# Patient Record
Sex: FEMALE | Race: WHITE | NOT HISPANIC OR LATINO | Employment: FULL TIME | URBAN - METROPOLITAN AREA
[De-identification: names, ages, dates, MRNs, and addresses within clinical notes are randomized per-mention and may not be internally consistent; named-entity substitution may affect disease eponyms.]

---

## 2018-02-21 ENCOUNTER — TRANSFERRED RECORDS (OUTPATIENT)
Dept: HEALTH INFORMATION MANAGEMENT | Facility: CLINIC | Age: 33
End: 2018-02-21

## 2018-02-21 LAB
ABO + RH BLD: NORMAL
ABO + RH BLD: NORMAL
BLD GP AB SCN SERPL QL: NORMAL
HBV SURFACE AG SERPL QL IA: NORMAL
HEP C HIM: NORMAL
HIV 1+2 AB+HIV1 P24 AG SERPL QL IA: NORMAL
RUBELLA ABY IGG: 48
RUBELLA ANTIBODY IGG QUANTITATIVE: NORMAL IU/ML
T PALLIDUM IGG SER QL: NORMAL

## 2018-09-18 LAB — GROUP B STREP PCR: NORMAL

## 2018-10-02 ENCOUNTER — HOSPITAL ENCOUNTER (OUTPATIENT)
Facility: CLINIC | Age: 33
Discharge: HOME | End: 2018-10-02
Attending: OBSTETRICS & GYNECOLOGY | Admitting: OBSTETRICS & GYNECOLOGY
Payer: OTHER GOVERNMENT

## 2018-10-02 VITALS
WEIGHT: 169 LBS | BODY MASS INDEX: 29.95 KG/M2 | HEIGHT: 63 IN | RESPIRATION RATE: 18 BRPM | TEMPERATURE: 99.1 F | SYSTOLIC BLOOD PRESSURE: 132 MMHG | DIASTOLIC BLOOD PRESSURE: 80 MMHG

## 2018-10-02 LAB
ALBUMIN SERPL-MCNC: 2.8 G/DL (ref 3.4–5)
ALP SERPL-CCNC: 116 U/L (ref 40–150)
ALT SERPL W P-5'-P-CCNC: 16 U/L (ref 0–50)
ANION GAP SERPL CALCULATED.3IONS-SCNC: 13 MMOL/L (ref 3–14)
AST SERPL W P-5'-P-CCNC: 16 U/L (ref 0–45)
BILIRUB SERPL-MCNC: 0.3 MG/DL (ref 0.2–1.3)
BUN SERPL-MCNC: 7 MG/DL (ref 7–30)
CALCIUM SERPL-MCNC: 8.6 MG/DL (ref 8.5–10.1)
CHLORIDE SERPL-SCNC: 103 MMOL/L (ref 94–109)
CO2 SERPL-SCNC: 22 MMOL/L (ref 20–32)
CREAT SERPL-MCNC: 0.52 MG/DL (ref 0.52–1.04)
ERYTHROCYTE [DISTWIDTH] IN BLOOD BY AUTOMATED COUNT: 12.5 % (ref 10–15)
GFR SERPL CREATININE-BSD FRML MDRD: >90 ML/MIN/1.7M2
GLUCOSE SERPL-MCNC: 81 MG/DL (ref 70–99)
HCT VFR BLD AUTO: 38.1 % (ref 35–47)
HGB BLD-MCNC: 13 G/DL (ref 11.7–15.7)
MCH RBC QN AUTO: 31.5 PG (ref 26.5–33)
MCHC RBC AUTO-ENTMCNC: 34.1 G/DL (ref 31.5–36.5)
MCV RBC AUTO: 92 FL (ref 78–100)
PLATELET # BLD AUTO: 126 10E9/L (ref 150–450)
POTASSIUM SERPL-SCNC: 3.9 MMOL/L (ref 3.4–5.3)
PROT SERPL-MCNC: 6.8 G/DL (ref 6.8–8.8)
PROT UR-MCNC: 0.06 G/L
PROT/CREAT 24H UR: 0.26 G/G CR (ref 0–0.2)
RBC # BLD AUTO: 4.13 10E12/L (ref 3.8–5.2)
SODIUM SERPL-SCNC: 138 MMOL/L (ref 133–144)
WBC # BLD AUTO: 8.4 10E9/L (ref 4–11)

## 2018-10-02 PROCEDURE — 85027 COMPLETE CBC AUTOMATED: CPT | Performed by: OBSTETRICS & GYNECOLOGY

## 2018-10-02 PROCEDURE — 59025 FETAL NON-STRESS TEST: CPT

## 2018-10-02 PROCEDURE — G0463 HOSPITAL OUTPT CLINIC VISIT: HCPCS | Mod: 25

## 2018-10-02 PROCEDURE — 80053 COMPREHEN METABOLIC PANEL: CPT | Performed by: OBSTETRICS & GYNECOLOGY

## 2018-10-02 PROCEDURE — 84156 ASSAY OF PROTEIN URINE: CPT | Performed by: OBSTETRICS & GYNECOLOGY

## 2018-10-02 PROCEDURE — 36415 COLL VENOUS BLD VENIPUNCTURE: CPT | Performed by: OBSTETRICS & GYNECOLOGY

## 2018-10-02 RX ORDER — ONDANSETRON 2 MG/ML
4 INJECTION INTRAMUSCULAR; INTRAVENOUS EVERY 6 HOURS PRN
Status: DISCONTINUED | OUTPATIENT
Start: 2018-10-02 | End: 2018-10-02 | Stop reason: HOSPADM

## 2018-10-02 NOTE — IP AVS SNAPSHOT
MRN:[id number]                      After Visit Summary   10/2/2018    Judith Fuentes    MRN: [id number]           Thank you!     Thank you for choosing [facility] for your care. Our goal is always to provide you with excellent care. Hearing back from our patients is one way we can continue to improve our services. Please take a few minutes to complete the written survey that you may receive in the mail after you visit with us. Thank you!        Patient Information     Date Of Birth          1985        About your hospital stay     You were admitted on:  October 2, 2018 You last received care in the:  [facility]    You were discharged on:  October 2, 2018       Who to Call     For medical emergencies, please call 911.  For non-urgent questions about your medical care, please call your primary care provider or clinic, [phone]          Attending Provider     Provider Specialty    Yamilka Jaramillo MD OB/Gyn       Primary Care Provider Office Phone # Fax #    Yamilka Jaramillo [phone]      Further instructions from your care team       Discharge Instruction for Undelivered Patients      You were seen for: blood pressure evaluation  We Consulted: Dr. Jaramillo  You had (Test or Medicine): Fetal non-stress test, labs, and blood pressure checks     Diet:   Drink 8 to 12 glasses of liquids (milk, juice, water) every day.  You may eat meals and snacks.     Activity:  Call your doctor or nurse midwife if your baby is moving less than usual.     Call your provider if you notice:  Swelling in your face or increased swelling in your hands or legs.  Headaches that are not relieved by Tylenol (acetaminophen).  Changes in your vision (blurring: seeing spots or stars.)  Nausea (sick to your stomach) and vomiting (throwing up).   Weight gain of 5 pounds or more per week.  Heartburn that doesn't go away.  Signs of bladder infection: pain when you urinate (use the toilet), need  "to go more often and more urgently.  The bag of lobo (rupture of membranes) breaks, or you notice leaking in your underwear.  Bright red blood in your underwear.  Abdominal (lower belly) or stomach pain.  Second (plus) baby: Contractions (tightening) less than 10 minutes apart and getting stronger.  Increase or change in vaginal discharge (note the color and amount)  Other:     Follow-up:  Make an appointment to be seen on Thursday          Pending Results     No orders found from 2018 to 10/3/2018.            Admission Information     Date & Time Provider Department Dept. Phone    10/2/2018 Yamilka Jaarmillo MD [facility] [phone]      Your Vitals Were     Blood Pressure Temperature Respirations Height Weight BMI (Body Mass Index)    132/78 99.1  F (37.3  C) (Temporal) 18 1.6 m (5' 3\") 76.7 kg (169 lb) 29.94 kg/m2      MyChart Information     [employer] lets you send messages to your doctor, view your test results, renew your prescriptions, schedule appointments and more. To sign up, go to [email or web address] . Click on \"Log in\" on the left side of the screen, which will take you to the Welcome page. Then click on \"Sign up Now\" on the right side of the page.     You will be asked to enter the access code listed below, as well as some personal information. Please follow the directions to create your username and password.     Your access code is: [email or web address]  Expires: 2018  5:45 PM     Your access code will  in 90 days. If you need help or a new code, please call your [facility] clinic or [phone].        Care EveryWhere ID     This is your Care EveryWhere ID. This could be used by other organizations to access your [facility] medical records  [id number]        Equal Access to Services     MALI LOVE : Jose L Haas, gibran richardson, maris lim, merari jean. So [facility] [phone].    ATENCIÓN: Si latesha burciaga " a albright disposición servicios gratuitos de asistencia lingüística. Mary stover [phone].    We comply with applicable federal civil rights laws and [place] laws. We do not discriminate on the basis of race, color, national origin, age, disability, sex, sexual orientation, or gender identity.               Review of your medicines      UNREVIEWED medicines. Ask your doctor about these medicines        Dose / Directions    prenatal multivitamin plus iron 27-0.8 MG Tabs per tablet        Dose:  1 tablet   Take 1 tablet by mouth daily   Refills:  0                Protect others around you: Learn how to safely use, store and throw away your medicines at www.disposemymeds.org.             Medication List: This is a list of all your medications and when to take them. Check marks below indicate your daily home schedule. Keep this list as a reference.      Medications           Morning Afternoon Evening Bedtime As Needed    prenatal multivitamin plus iron 27-0.8 MG Tabs per tablet   Take 1 tablet by mouth daily

## 2018-10-02 NOTE — IP AVS SNAPSHOT
[address], [address]    [facility] [address]    Phone:  [phone]                                       After Visit Summary   10/2/2018    Judith Fuentes    MRN: [id number]           After Visit Summary Signature Page     I have received my discharge instructions, and my questions have been answered. I have discussed any challenges I see with this plan with the nurse or doctor.    ..........................................................................................................................................  Patient/Patient Representative Signature      ..........................................................................................................................................  Patient Representative Print Name and Relationship to Patient    ..................................................               ................................................  Date                                   Time    ..........................................................................................................................................  Reviewed by Signature/Title    ...................................................              ..............................................  Date                                               Time          22EPIC Rev 08/18

## 2018-10-02 NOTE — DISCHARGE INSTRUCTIONS
Discharge Instruction for Undelivered Patients      You were seen for: blood pressure evaluation  We Consulted: Dr. Jaramillo  You had (Test or Medicine): Fetal non-stress test, labs, and blood pressure checks     Diet:   Drink 8 to 12 glasses of liquids (milk, juice, water) every day.  You may eat meals and snacks.     Activity:  Call your doctor or nurse midwife if your baby is moving less than usual.     Call your provider if you notice:  Swelling in your face or increased swelling in your hands or legs.  Headaches that are not relieved by Tylenol (acetaminophen).  Changes in your vision (blurring: seeing spots or stars.)  Nausea (sick to your stomach) and vomiting (throwing up).   Weight gain of 5 pounds or more per week.  Heartburn that doesn't go away.  Signs of bladder infection: pain when you urinate (use the toilet), need to go more often and more urgently.  The bag of lobo (rupture of membranes) breaks, or you notice leaking in your underwear.  Bright red blood in your underwear.  Abdominal (lower belly) or stomach pain.  Second (plus) baby: Contractions (tightening) less than 10 minutes apart and getting stronger.  Increase or change in vaginal discharge (note the color and amount)  Other:     Follow-up:  Make an appointment to be seen on Thursday

## 2018-10-02 NOTE — PLAN OF CARE
Data: [facility] at 1620.   Reason for maternal/fetal assessment per patient is Rule Out Pre-eclampsia   Patient is a . Prenatal record reviewed. Gestational Age 38w4d. Fetal movement present. Patient denies cramping, backache, vaginal discharge, bloody show, vaginal bleeding, edema, headache, visual disturbances, epigastric or URQ pain, abdominal pain, rupture of membranes.   Action: Verbal consent for EFM. Fetal assessment: Presumed adequate fetal oxygenation documented (see flow record). Patient instructed to report change in fetal movement, vaginal leaking of fluid or bleeding, abdominal pain, or any concerns related to the pregnancy to her nurse/physician.   Response: Dr. Jaramillo informed of lab values, blood pressure values, reactive NST, toco ctx q2-3 min, pt feels occasional and does not report as painful. Plan per provider is discharge to home, follow up in clinic on Thursday. Patient verbalized understanding of education and verbalized agreement with plan. Discharged ambulatory at 1750.

## 2018-10-05 ENCOUNTER — HOSPITAL ENCOUNTER (INPATIENT)
Facility: CLINIC | Age: 33
LOS: 1 days | Discharge: HOME | End: 2018-10-06
Attending: OBSTETRICS & GYNECOLOGY | Admitting: OBSTETRICS & GYNECOLOGY
Payer: OTHER GOVERNMENT

## 2018-10-05 ENCOUNTER — ANESTHESIA EVENT (OUTPATIENT)
Dept: OBGYN | Facility: CLINIC | Age: 33
End: 2018-10-05
Payer: OTHER GOVERNMENT

## 2018-10-05 ENCOUNTER — ANESTHESIA (OUTPATIENT)
Dept: OBGYN | Facility: CLINIC | Age: 33
End: 2018-10-05
Payer: OTHER GOVERNMENT

## 2018-10-05 PROBLEM — O13.9 GESTATIONAL HYPERTENSION: Status: ACTIVE | Noted: 2018-10-05

## 2018-10-05 LAB
ABO + RH BLD: NORMAL
ABO + RH BLD: NORMAL
ALBUMIN SERPL-MCNC: 2.7 G/DL (ref 3.4–5)
ALP SERPL-CCNC: 112 U/L (ref 40–150)
ALT SERPL W P-5'-P-CCNC: 15 U/L (ref 0–50)
ANION GAP SERPL CALCULATED.3IONS-SCNC: 8 MMOL/L (ref 3–14)
AST SERPL W P-5'-P-CCNC: 18 U/L (ref 0–45)
BASOPHILS # BLD AUTO: 0 10E9/L (ref 0–0.2)
BASOPHILS NFR BLD AUTO: 0.1 %
BILIRUB SERPL-MCNC: 0.3 MG/DL (ref 0.2–1.3)
BLD GP AB SCN SERPL QL: NORMAL
BLOOD BANK CMNT PATIENT-IMP: NORMAL
BUN SERPL-MCNC: 7 MG/DL (ref 7–30)
CALCIUM SERPL-MCNC: 8.2 MG/DL (ref 8.5–10.1)
CHLORIDE SERPL-SCNC: 107 MMOL/L (ref 94–109)
CO2 SERPL-SCNC: 24 MMOL/L (ref 20–32)
CREAT SERPL-MCNC: 0.52 MG/DL (ref 0.52–1.04)
DIFFERENTIAL METHOD BLD: ABNORMAL
EOSINOPHIL # BLD AUTO: 0.1 10E9/L (ref 0–0.7)
EOSINOPHIL NFR BLD AUTO: 1.4 %
ERYTHROCYTE [DISTWIDTH] IN BLOOD BY AUTOMATED COUNT: 12.5 % (ref 10–15)
GFR SERPL CREATININE-BSD FRML MDRD: >90 ML/MIN/1.7M2
GLUCOSE SERPL-MCNC: 76 MG/DL (ref 70–99)
HCT VFR BLD AUTO: 36.2 % (ref 35–47)
HGB BLD-MCNC: 12.2 G/DL (ref 11.7–15.7)
IMM GRANULOCYTES # BLD: 0 10E9/L (ref 0–0.4)
IMM GRANULOCYTES NFR BLD: 0.6 %
LYMPHOCYTES # BLD AUTO: 1.1 10E9/L (ref 0.8–5.3)
LYMPHOCYTES NFR BLD AUTO: 16.4 %
MCH RBC QN AUTO: 31.3 PG (ref 26.5–33)
MCHC RBC AUTO-ENTMCNC: 33.7 G/DL (ref 31.5–36.5)
MCV RBC AUTO: 93 FL (ref 78–100)
MONOCYTES # BLD AUTO: 0.5 10E9/L (ref 0–1.3)
MONOCYTES NFR BLD AUTO: 6.9 %
NEUTROPHILS # BLD AUTO: 5.2 10E9/L (ref 1.6–8.3)
NEUTROPHILS NFR BLD AUTO: 74.6 %
NRBC # BLD AUTO: 0 10*3/UL
NRBC BLD AUTO-RTO: 0 /100
PLATELET # BLD AUTO: 107 10E9/L (ref 150–450)
PLATELET # BLD AUTO: 117 10E9/L (ref 150–450)
POTASSIUM SERPL-SCNC: 4.1 MMOL/L (ref 3.4–5.3)
PROT SERPL-MCNC: 6.7 G/DL (ref 6.8–8.8)
RBC # BLD AUTO: 3.9 10E12/L (ref 3.8–5.2)
SODIUM SERPL-SCNC: 139 MMOL/L (ref 133–144)
SPECIMEN EXP DATE BLD: NORMAL
T PALLIDUM AB SER QL: NONREACTIVE
WBC # BLD AUTO: 6.9 10E9/L (ref 4–11)

## 2018-10-05 PROCEDURE — 12000037 ZZH R&B POSTPARTUM INTERMEDIATE

## 2018-10-05 PROCEDURE — 80053 COMPREHEN METABOLIC PANEL: CPT | Performed by: OBSTETRICS & GYNECOLOGY

## 2018-10-05 PROCEDURE — 25000128 H RX IP 250 OP 636: Performed by: ANESTHESIOLOGY

## 2018-10-05 PROCEDURE — 85049 AUTOMATED PLATELET COUNT: CPT | Performed by: ANESTHESIOLOGY

## 2018-10-05 PROCEDURE — 3E0R3BZ INTRODUCTION OF ANESTHETIC AGENT INTO SPINAL CANAL, PERCUTANEOUS APPROACH: ICD-10-PCS | Performed by: ANESTHESIOLOGY

## 2018-10-05 PROCEDURE — 86850 RBC ANTIBODY SCREEN: CPT | Performed by: OBSTETRICS & GYNECOLOGY

## 2018-10-05 PROCEDURE — 85025 COMPLETE CBC W/AUTO DIFF WBC: CPT | Performed by: OBSTETRICS & GYNECOLOGY

## 2018-10-05 PROCEDURE — 72200001 ZZH LABOR CARE VAGINAL DELIVERY SINGLE

## 2018-10-05 PROCEDURE — 27110038 ZZH RX 271: Performed by: ANESTHESIOLOGY

## 2018-10-05 PROCEDURE — 86780 TREPONEMA PALLIDUM: CPT | Performed by: OBSTETRICS & GYNECOLOGY

## 2018-10-05 PROCEDURE — 25000128 H RX IP 250 OP 636: Performed by: OBSTETRICS & GYNECOLOGY

## 2018-10-05 PROCEDURE — 10907ZC DRAINAGE OF AMNIOTIC FLUID, THERAPEUTIC FROM PRODUCTS OF CONCEPTION, VIA NATURAL OR ARTIFICIAL OPENING: ICD-10-PCS | Performed by: OBSTETRICS & GYNECOLOGY

## 2018-10-05 PROCEDURE — 25000125 ZZHC RX 250: Performed by: OBSTETRICS & GYNECOLOGY

## 2018-10-05 PROCEDURE — 00HU33Z INSERTION OF INFUSION DEVICE INTO SPINAL CANAL, PERCUTANEOUS APPROACH: ICD-10-PCS | Performed by: ANESTHESIOLOGY

## 2018-10-05 PROCEDURE — 0KQM0ZZ REPAIR PERINEUM MUSCLE, OPEN APPROACH: ICD-10-PCS | Performed by: OBSTETRICS & GYNECOLOGY

## 2018-10-05 PROCEDURE — 25000132 ZZH RX MED GY IP 250 OP 250 PS 637: Performed by: OBSTETRICS & GYNECOLOGY

## 2018-10-05 PROCEDURE — 36415 COLL VENOUS BLD VENIPUNCTURE: CPT | Performed by: OBSTETRICS & GYNECOLOGY

## 2018-10-05 PROCEDURE — 86900 BLOOD TYPING SEROLOGIC ABO: CPT | Performed by: OBSTETRICS & GYNECOLOGY

## 2018-10-05 PROCEDURE — 86901 BLOOD TYPING SEROLOGIC RH(D): CPT | Performed by: OBSTETRICS & GYNECOLOGY

## 2018-10-05 PROCEDURE — 36415 COLL VENOUS BLD VENIPUNCTURE: CPT | Performed by: ANESTHESIOLOGY

## 2018-10-05 PROCEDURE — 3E033VJ INTRODUCTION OF OTHER HORMONE INTO PERIPHERAL VEIN, PERCUTANEOUS APPROACH: ICD-10-PCS | Performed by: OBSTETRICS & GYNECOLOGY

## 2018-10-05 PROCEDURE — 37000011 ZZH ANESTHESIA WARD SERVICE

## 2018-10-05 RX ORDER — OXYTOCIN 10 [USP'U]/ML
10 INJECTION, SOLUTION INTRAMUSCULAR; INTRAVENOUS
Status: DISCONTINUED | OUTPATIENT
Start: 2018-10-05 | End: 2018-10-06 | Stop reason: HOSPADM

## 2018-10-05 RX ORDER — CARBOPROST TROMETHAMINE 250 UG/ML
250 INJECTION, SOLUTION INTRAMUSCULAR
Status: DISCONTINUED | OUTPATIENT
Start: 2018-10-05 | End: 2018-10-05

## 2018-10-05 RX ORDER — OXYTOCIN/0.9 % SODIUM CHLORIDE 30/500 ML
1-24 PLASTIC BAG, INJECTION (ML) INTRAVENOUS CONTINUOUS
Status: DISCONTINUED | OUTPATIENT
Start: 2018-10-05 | End: 2018-10-05

## 2018-10-05 RX ORDER — NALOXONE HYDROCHLORIDE 0.4 MG/ML
.1-.4 INJECTION, SOLUTION INTRAMUSCULAR; INTRAVENOUS; SUBCUTANEOUS
Status: DISCONTINUED | OUTPATIENT
Start: 2018-10-05 | End: 2018-10-05

## 2018-10-05 RX ORDER — OXYCODONE AND ACETAMINOPHEN 5; 325 MG/1; MG/1
1 TABLET ORAL
Status: DISCONTINUED | OUTPATIENT
Start: 2018-10-05 | End: 2018-10-05

## 2018-10-05 RX ORDER — AMOXICILLIN 250 MG
1 CAPSULE ORAL 2 TIMES DAILY
Status: DISCONTINUED | OUTPATIENT
Start: 2018-10-05 | End: 2018-10-06 | Stop reason: HOSPADM

## 2018-10-05 RX ORDER — ROPIVACAINE HYDROCHLORIDE 2 MG/ML
10 INJECTION, SOLUTION EPIDURAL; INFILTRATION; PERINEURAL ONCE
Status: COMPLETED | OUTPATIENT
Start: 2018-10-05 | End: 2018-10-05

## 2018-10-05 RX ORDER — OXYTOCIN/0.9 % SODIUM CHLORIDE 30/500 ML
100 PLASTIC BAG, INJECTION (ML) INTRAVENOUS CONTINUOUS
Status: DISCONTINUED | OUTPATIENT
Start: 2018-10-05 | End: 2018-10-06 | Stop reason: HOSPADM

## 2018-10-05 RX ORDER — OXYTOCIN/0.9 % SODIUM CHLORIDE 30/500 ML
340 PLASTIC BAG, INJECTION (ML) INTRAVENOUS CONTINUOUS PRN
Status: DISCONTINUED | OUTPATIENT
Start: 2018-10-05 | End: 2018-10-06 | Stop reason: HOSPADM

## 2018-10-05 RX ORDER — FENTANYL CITRATE 50 UG/ML
50-100 INJECTION, SOLUTION INTRAMUSCULAR; INTRAVENOUS
Status: DISCONTINUED | OUTPATIENT
Start: 2018-10-05 | End: 2018-10-05

## 2018-10-05 RX ORDER — NALOXONE HYDROCHLORIDE 0.4 MG/ML
.1-.4 INJECTION, SOLUTION INTRAMUSCULAR; INTRAVENOUS; SUBCUTANEOUS
Status: DISCONTINUED | OUTPATIENT
Start: 2018-10-05 | End: 2018-10-06 | Stop reason: HOSPADM

## 2018-10-05 RX ORDER — SODIUM CHLORIDE, SODIUM LACTATE, POTASSIUM CHLORIDE, CALCIUM CHLORIDE 600; 310; 30; 20 MG/100ML; MG/100ML; MG/100ML; MG/100ML
INJECTION, SOLUTION INTRAVENOUS CONTINUOUS
Status: DISCONTINUED | OUTPATIENT
Start: 2018-10-05 | End: 2018-10-05

## 2018-10-05 RX ORDER — ACETAMINOPHEN 325 MG/1
650 TABLET ORAL EVERY 4 HOURS PRN
Status: DISCONTINUED | OUTPATIENT
Start: 2018-10-05 | End: 2018-10-06 | Stop reason: HOSPADM

## 2018-10-05 RX ORDER — HYDROCORTISONE 2.5 %
CREAM (GRAM) TOPICAL 3 TIMES DAILY PRN
Status: DISCONTINUED | OUTPATIENT
Start: 2018-10-05 | End: 2018-10-06 | Stop reason: HOSPADM

## 2018-10-05 RX ORDER — OXYCODONE HYDROCHLORIDE 5 MG/1
5 TABLET ORAL EVERY 4 HOURS PRN
Status: DISCONTINUED | OUTPATIENT
Start: 2018-10-05 | End: 2018-10-06 | Stop reason: HOSPADM

## 2018-10-05 RX ORDER — IBUPROFEN 400 MG/1
800 TABLET, FILM COATED ORAL EVERY 6 HOURS PRN
Status: DISCONTINUED | OUTPATIENT
Start: 2018-10-05 | End: 2018-10-06 | Stop reason: HOSPADM

## 2018-10-05 RX ORDER — AMOXICILLIN 250 MG
2 CAPSULE ORAL 2 TIMES DAILY
Status: DISCONTINUED | OUTPATIENT
Start: 2018-10-05 | End: 2018-10-06 | Stop reason: HOSPADM

## 2018-10-05 RX ORDER — OXYTOCIN 10 [USP'U]/ML
10 INJECTION, SOLUTION INTRAMUSCULAR; INTRAVENOUS
Status: DISCONTINUED | OUTPATIENT
Start: 2018-10-05 | End: 2018-10-05

## 2018-10-05 RX ORDER — ONDANSETRON 2 MG/ML
4 INJECTION INTRAMUSCULAR; INTRAVENOUS EVERY 6 HOURS PRN
Status: DISCONTINUED | OUTPATIENT
Start: 2018-10-05 | End: 2018-10-05

## 2018-10-05 RX ORDER — EPHEDRINE SULFATE 50 MG/ML
5 INJECTION, SOLUTION INTRAMUSCULAR; INTRAVENOUS; SUBCUTANEOUS
Status: DISCONTINUED | OUTPATIENT
Start: 2018-10-05 | End: 2018-10-05

## 2018-10-05 RX ORDER — METHYLERGONOVINE MALEATE 0.2 MG/ML
200 INJECTION INTRAVENOUS
Status: DISCONTINUED | OUTPATIENT
Start: 2018-10-05 | End: 2018-10-05

## 2018-10-05 RX ORDER — NALBUPHINE HYDROCHLORIDE 10 MG/ML
2.5-5 INJECTION, SOLUTION INTRAMUSCULAR; INTRAVENOUS; SUBCUTANEOUS EVERY 6 HOURS PRN
Status: DISCONTINUED | OUTPATIENT
Start: 2018-10-05 | End: 2018-10-05

## 2018-10-05 RX ORDER — PRENATAL VIT/IRON FUM/FOLIC AC 27MG-0.8MG
1 TABLET ORAL DAILY
Status: DISCONTINUED | OUTPATIENT
Start: 2018-10-05 | End: 2018-10-06 | Stop reason: HOSPADM

## 2018-10-05 RX ORDER — BISACODYL 10 MG
10 SUPPOSITORY, RECTAL RECTAL DAILY PRN
Status: DISCONTINUED | OUTPATIENT
Start: 2018-10-07 | End: 2018-10-06 | Stop reason: HOSPADM

## 2018-10-05 RX ORDER — ACETAMINOPHEN 325 MG/1
650 TABLET ORAL EVERY 4 HOURS PRN
Status: DISCONTINUED | OUTPATIENT
Start: 2018-10-05 | End: 2018-10-05

## 2018-10-05 RX ORDER — HYDROMORPHONE HYDROCHLORIDE 1 MG/ML
0.3 INJECTION, SOLUTION INTRAMUSCULAR; INTRAVENOUS; SUBCUTANEOUS
Status: DISCONTINUED | OUTPATIENT
Start: 2018-10-05 | End: 2018-10-06 | Stop reason: HOSPADM

## 2018-10-05 RX ORDER — LANOLIN 100 %
OINTMENT (GRAM) TOPICAL
Status: DISCONTINUED | OUTPATIENT
Start: 2018-10-05 | End: 2018-10-06 | Stop reason: HOSPADM

## 2018-10-05 RX ORDER — LIDOCAINE 40 MG/G
CREAM TOPICAL
Status: DISCONTINUED | OUTPATIENT
Start: 2018-10-05 | End: 2018-10-05

## 2018-10-05 RX ORDER — IBUPROFEN 400 MG/1
800 TABLET, FILM COATED ORAL
Status: DISCONTINUED | OUTPATIENT
Start: 2018-10-05 | End: 2018-10-05

## 2018-10-05 RX ORDER — OXYTOCIN/0.9 % SODIUM CHLORIDE 30/500 ML
100-340 PLASTIC BAG, INJECTION (ML) INTRAVENOUS CONTINUOUS PRN
Status: COMPLETED | OUTPATIENT
Start: 2018-10-05 | End: 2018-10-05

## 2018-10-05 RX ADMIN — ROPIVACAINE HYDROCHLORIDE 10 ML: 2 INJECTION, SOLUTION EPIDURAL; INFILTRATION at 12:11

## 2018-10-05 RX ADMIN — OXYTOCIN-SODIUM CHLORIDE 0.9% IV SOLN 30 UNIT/500ML 2 MILLI-UNITS/MIN: 30-0.9/5 SOLUTION at 09:13

## 2018-10-05 RX ADMIN — ACETAMINOPHEN 650 MG: 325 TABLET, FILM COATED ORAL at 15:44

## 2018-10-05 RX ADMIN — SODIUM CHLORIDE, POTASSIUM CHLORIDE, SODIUM LACTATE AND CALCIUM CHLORIDE: 600; 310; 30; 20 INJECTION, SOLUTION INTRAVENOUS at 09:10

## 2018-10-05 RX ADMIN — Medication 12 ML/HR: at 12:07

## 2018-10-05 RX ADMIN — OXYTOCIN-SODIUM CHLORIDE 0.9% IV SOLN 30 UNIT/500ML 130 ML/HR: 30-0.9/5 SOLUTION at 14:20

## 2018-10-05 RX ADMIN — IBUPROFEN 800 MG: 400 TABLET ORAL at 21:59

## 2018-10-05 RX ADMIN — IBUPROFEN 800 MG: 400 TABLET ORAL at 15:43

## 2018-10-05 RX ADMIN — ACETAMINOPHEN 650 MG: 325 TABLET, FILM COATED ORAL at 20:04

## 2018-10-05 RX ADMIN — SENNOSIDES AND DOCUSATE SODIUM 1 TABLET: 8.6; 5 TABLET ORAL at 20:04

## 2018-10-05 NOTE — IP AVS SNAPSHOT
[address]    [place] [address]    Phone:  [phone]                                       After Visit Summary   10/5/2018    Judith Fuentes    MRN: [id number]           After Visit Summary Signature Page     I have received my discharge instructions, and my questions have been answered. I have discussed any challenges I see with this plan with the nurse or doctor.    ..........................................................................................................................................  Patient/Patient Representative Signature      ..........................................................................................................................................  Patient Representative Print Name and Relationship to Patient    ..................................................               ................................................  Date                                   Time    ..........................................................................................................................................  Reviewed by Signature/Title    ...................................................              ..............................................  Date                                               Time          22EPIC Rev 08/18

## 2018-10-05 NOTE — L&D DELIVERY NOTE
Judith Fuentes   Admission date: 10/5/2018  Delivery date:  10/05/18  Place of delivery: [facility]    The patient is a 33 year old  at 39w0d gestation admitted to labor and delivery for the indication of gestational hypertension.  Her  course was otherwise uncomplicated.  The estimate fetal weight is 7lbs.      Pitocin was started for induction right after the membranes were ruptured. The fluid was clear. For pain management she had epidural with good relief.      She progressed to complete dilation at 1330.  She had excellent maternal expulsive efforts. At 1415 she delivered a viable male infant with apgars of 9 at 1 min and 9 at 5 minutes.  The infant was bulb suctioned on the perineum and again upon delivery.  The infant was handed off to his parents and the nursery team in attendance.    The placenta delivered spontaneously and intact.  She received pitocin upon placental delivery.  The QBL was being calculated but estimated to be average.    The cervix and vagina were inspected.  There was a second degree perineal laceration which was repaired with 3-0 vicryl assuring hemostasis and close approximation.     During labor the fetal heart tones were category I. While transitioning to complete, category II heart tones with resolution with repositioning, O2 and discontinuation of pitocin.    Mother and baby did well and went to normal postpartum and  nursery.    GBS was negative.      Yamilka Jaramillo

## 2018-10-05 NOTE — PLAN OF CARE
Problem: Patient Care Overview  Goal: Plan of Care/Patient Progress Review  Outcome: No Change  Patient arrived to the unit with baby in arms and  by her side. They were oriented to the room and safety education was completed. Both verbalized understanding. Continuing to monitor. Vital signs stable. Patient voiding without difficulty. Able to ambulate in room free of dizziness. Taking tylenol/ibuprofen for pain management. Working on breastfeeding infant every 2-3 hours. Encouraged to call with questions/concerns. Will continue to monitor.

## 2018-10-05 NOTE — PLAN OF CARE
Patient admitted at 39 weeks to room 232 for IOL for gestational HTN. Monitors applied with Patient permission. Initial VS and assessment complete. Will continue to monitor.

## 2018-10-05 NOTE — PLAN OF CARE
Data: Judith Fuentes transferred to [facility] via wheelchair at 1700. Baby transferred via parent's arms.  Action: Receiving unit notified of transfer: Yes. Patient and family notified of room change. Report given to Maryuri LYN at 1705. Belongings sent to receiving unit. Accompanied by Registered Nurse. Oriented patient to surroundings. Call light within reach. ID bands double-checked with receiving RN.  Response: Patient tolerated transfer and is stable.

## 2018-10-05 NOTE — H&P
2018    Judith Fuentes  [id number]            OB Admit History & Physical      CC: IOL       HPI: She is a 33 year old  at 39w0d here for IOL for the indication of gestational hypertension.  Over the last two weeks, pt has had blood pressure elevation.  Initially in the severe range then decrease to mild range.  On Tuesday, pt was evaluated in the MAC where labs and urine dip were found to be normal.   No other complications in pregnancy    Her OB history:   Obstetric History       T1      L1     SAB0   TAB0   Ectopic0   Multiple0   Live Births1       # Outcome Date GA Lbr Horacio/2nd Weight Sex Delivery Anes PTL Lv   2 Current            1 Term 16 38w3d 08:45 / 00:25 2.869 kg (6 lb 5.2 oz) M Vag-Spont EPI N RUKHSANA      Apgar1:  8                Apgar5: 9               Past Medical History:   Diagnosis Date     Hypertension     during pregnancy          Past Surgical History:   Procedure Laterality Date     wisdom teeth  2006         No current outpatient prescriptions on file.       Allergies: Review of patient's allergies indicates no known allergies.      REVIEW OF SYSTEMS:  NEUROLOGIC:  Negative  EYES:  Negative  ENT:  Negative  GI:  Negative  BREAST:  Negative  :  Negative  GYN:  Negative  CV:  Negative  PULMONARY:  Negative  MUSCULOSKELETAL:  Negative  PSYCH:  Negative        Social History     Social History     Marital status:      Spouse name: N/A     Number of children: N/A     Years of education: N/A     Occupational History     Not on file.     Social History Main Topics     Smoking status: Never Smoker     Smokeless tobacco: Never Used     Alcohol use No     Drug use: No     Sexual activity: Yes     Partners: Male     Other Topics Concern     Not on file     Social History Narrative      No family history on file.    ABO/Rh type and screen [[id number]] Collected: 10/05/18 0828      Lab Status: Final result Specimen: Blood Updated: 10/05/18 0927      ABO O       RH(D) Pos      Antibody Screen Neg      Test Valid Only At            [facility]      Specimen Expires 10/08/2018     Comprehensive metabolic panel [[id number]] (Abnormal) Collected: 10/05/18 0828     Lab Status: Final result Specimen: Blood Updated: 10/05/18 0903      Sodium 139 mmol/L       Potassium 4.1 mmol/L       Chloride 107 mmol/L       Carbon Dioxide 24 mmol/L       Anion Gap 8 mmol/L       Glucose 76 mg/dL       Urea Nitrogen 7 mg/dL       Creatinine 0.52 mg/dL       GFR Estimate >90 mL/min/1.7m2        Non African American GFR Calc         GFR Estimate If Black >90 mL/min/1.7m2        African American GFR Calc         Calcium 8.2 (L) mg/dL       Bilirubin Total 0.3 mg/dL       Albumin 2.7 (L) g/dL       Protein Total 6.7 (L) g/dL       Alkaline Phosphatase 112 U/L       ALT 15 U/L       AST 18 U/L      CBC with platelets differential [[id number]] (Abnormal) Collected: 10/05/18 0828     Lab Status: Final result Specimen: Blood Updated: 10/05/18 0841      WBC 6.9 10e9/L       RBC Count 3.90 10e12/L       Hemoglobin 12.2 g/dL       Hematocrit 36.2 %       MCV 93 fl       MCH 31.3 pg       MCHC 33.7 g/dL       RDW 12.5 %       Platelet Count 117 (L) 10e9/L       Diff Method Automated Method      % Neutrophils 74.6 %       % Lymphocytes 16.4 %       % Monocytes 6.9 %       % Eosinophils 1.4 %       % Basophils 0.1 %       % Immature Granulocytes 0.6 %       Nucleated RBCs 0 /100       Absolute Neutrophil 5.2 10e9/L       Absolute Lymphocytes 1.1 10e9/L       Absolute Monocytes 0.5 10e9/L       Absolute Eosinophils 0.1 10e9/L       Absolute Basophils 0.0 10e9/L       Abs Immature Granulocytes 0.0 10e9/L       Absolute Nucleated RBC 0.0     Treponema Abs w Reflex to RPR and Titer [[id number]] Collected: 10/05/18 0828     Lab Status: In process Specimen: Blood Updated: 10/05/18 0837           Vitals:   /58  Temp 98.2  F (36.8  C)  Resp 16  SpO2 98%   10/05/18 1240 98.2  F (36.8  C) -- --  105/58 -- 98 % -- -- -- -- AS      10/05/18 1230 -- -- -- 117/69 -- 96 % -- -- -- -- AS     10/05/18 1225 -- -- -- 126/74 -- 97 % -- -- -- -- AS     10/05/18 1220 -- -- -- 122/70 -- 97 % -- -- -- -- AS     10/05/18 1218 -- -- -- 122/70 -- 98 % -- -- -- -- AS     10/05/18 1216 -- -- -- 116/70 -- -- -- -- -- -- AS     10/05/18 1214 -- -- -- 115/72 -- -- -- -- -- -- AS     10/05/18 1212 -- -- -- 113/70 -- 99 % -- -- -- -- AS     10/05/18 121 -- -- -- 116/73 -- -- -- -- -- -- AS     10/05/18 1208 -- -- -- 125/77 -- -- -- -- -- -- AS     10/05/18 1207 -- -- -- 120/73 -- 100 % -- -- -- -- AS     10/05/18 1205 -- -- -- 127/76 -- -- -- -- -- -- AS     10/05/18 1200 -- -- -- 138/83 -- 99 % -- -- -- -- AS     10/05/18 1100 98.4  F (36.9  C) -- -- 122/81 16 -- -- -- -- -- AS     10/05/18 1030 -- -- -- 119/74 16 -- -- -- -- -- AS     10/05/18 0945 98.4  F (36.9  C) -- -- 117/75 16 -- -- -- -- -- AS     10/05/18 0808 97.9  F (36.6  C) -- -- 134/86 18 -- -- -- -- -- AS       Alert Awake in NAD  HEENT grossly normal  Neck: no lymphadenopathy or thryoidomegaly  Lungs clear  Heart regular  ABD gravid  Cervix is 3 cm / 80 % effaced at -2 station  AROM clear fluid  EXT:  no edema or calf tenderness  Neuro:  intact    Assessment:    IUP at 39w0d      GBS neg  Gestational hypertension    Plan:  Admit  AROM and pit for induction  Labs and continue to monitor bp  Continue care for anticipated vaginal delivery      Yamilka Jaramillo MD  Dept of OB/GYN  2018

## 2018-10-05 NOTE — IP AVS SNAPSHOT
MRN:[id number]                      After Visit Summary   10/5/2018    Judith Fuentes    MRN: [id number]           Thank you!     Thank you for choosing [facility] for your care. Our goal is always to provide you with excellent care. Hearing back from our patients is one way we can continue to improve our services. Please take a few minutes to complete the written survey that you may receive in the mail after you visit with us. Thank you!        Patient Information     Date Of Birth          1985        Designated Caregiver       Most Recent Value    Caregiver    Will someone help with your care after discharge? yes    Name of designated caregiver Ritesh, spouse      About your hospital stay     You were admitted on:  October 5, 2018 You last received care in the:  [address]    You were discharged on:  October 6, 2018        Reason for your hospital stay       Induction of labor and subsequent delivery                  Who to Call     For medical emergencies, please call 911.  For non-urgent questions about your medical care, please call your primary care provider or clinic, [phone]          Attending Provider     Provider Specialty    Yamilka Jaramillo MD OB/Gyn       Primary Care Provider Office Phone # Fax #    Yamilka Jaramillo [phone]       When to contact your care team       Call [facility] if you have any of the following: temperature greater than 100.4, increased shortness of breath, increased pain or heavy vaginal bleeding. Call for any headaches, vision changes, or pain in the upper abdomen especially on the right side.                  After Care Instructions     Activity       Your activity upon discharge: activity as tolerated and no sex for at least 4 weeks            Diet       Follow this diet upon discharge: Orders Placed This Encounter      Room Service      Regular Diet Adult                  Follow-up Appointments      Follow-up and recommended labs and tests        Follow up with Dr. Jaramillo at [facility], in 1 week for a blood pressure check as well as at 6 weeks for a routine postpartum check.                  Further instructions from your care team       Postpartum Vaginal Delivery Instructions    Activity       Ask family and friends for help when you need it.    Do not place anything in your vagina for 6 weeks.    You are not restricted on other activities, but take it easy for a few weeks to allow your body to recover from delivery.  You are able to do any activities you feel up to that point.    No driving until you have stopped taking your pain medications (usually two weeks after delivery).     Call your health care provider if you have any of these symptoms:       Increased pain, swelling, redness, or fluid around your stiches from an episiotomy or perineal tear.    A fever above 100.4 F (38 C) with or without chills when placing a thermometer under your tongue.    You soak a sanitary pad with blood within 1 hour, or you see blood clots larger than a golf ball.    Bleeding that lasts more than 6 weeks.    Vaginal discharge that smells bad.    Severe pain, cramping or tenderness in your lower belly area.    A need to urinate more frequently (use the toilet more often), more urgently (use the toilet very quickly), or it burns when you urinate.    Nausea and vomiting.    Redness, swelling or pain around a vein in your leg.    Problems breastfeeding or a red or painful area on your breast.    Chest pain and cough or are gasping for air.    Problems coping with sadness, anxiety, or depression.  If you have any concerns about hurting yourself or the baby, call your provider immediately.     You have questions or concerns after you return home.     Keep your hands clean:  Always wash your hands before touching your perineal area and stitches.  This helps reduce your risk of infection.  If your hands aren't dirty, you may  "use an alcohol hand-rub to clean your hands. Keep your nails clean and short.        Pending Results     No orders found for last 3 day(s).            Statement of Approval     Ordered          10/06/18 0912  I have reviewed and agree with all the recommendations and orders detailed in this document.  EFFECTIVE NOW     Approved and electronically signed by:  Ty Pickett MD             Admission Information     Date & Time Provider Department Dept. Phone    10/5/2018 Yamilka Jaramillo MD [address] [phone]      Your Vitals Were     Blood Pressure Temperature Respirations Pulse Oximetry          121/84 97.8  F (36.6  C) (Oral) 16 95%        MyChart Information     [facility] lets you send messages to your doctor, view your test results, renew your prescriptions, schedule appointments and more. To sign up, go to [email or web address] . Click on \"Log in\" on the left side of the screen, which will take you to the Welcome page. Then click on \"Sign up Now\" on the right side of the page.     You will be asked to enter the access code listed below, as well as some personal information. Please follow the directions to create your username and password.     Your access code is: [email or web address]  Expires: 2018  5:45 PM     Your access code will  in 90 days. If you need help or a new code, please call your [facility] clinic or [phone].        Care EveryWhere ID     This is your Care EveryWhere ID. This could be used by other organizations to access your [facility] medical records  [id number]        Equal Access to Services     [facility] : Hadii aad ku hadasho Soradhaali, waaxda luqadaha, qaybta kaalmada merari lim . So [facility] [phone].    ATENCIÓN: Si habla español, tiene a albright disposición servicios gratuitos de asistencia lingüística. Llame al [phone].    We comply with applicable federal civil rights laws and [place] laws. We do not " discriminate on the basis of race, color, national origin, age, disability, sex, sexual orientation, or gender identity.               Review of your medicines      CONTINUE these medicines which have NOT CHANGED        Dose / Directions    prenatal multivitamin plus iron 27-0.8 MG Tabs per tablet        Dose:  1 tablet   Take 1 tablet by mouth daily   Refills:  0                Protect others around you: Learn how to safely use, store and throw away your medicines at www.disposemymeds.org.             Medication List: This is a list of all your medications and when to take them. Check marks below indicate your daily home schedule. Keep this list as a reference.      Medications           Morning Afternoon Evening Bedtime As Needed    prenatal multivitamin plus iron 27-0.8 MG Tabs per tablet   Take 1 tablet by mouth daily   Last time this was given:  1 tablet on 10/6/2018  8:37 AM                                          More Information        After a Vaginal Birth    After having a baby, your body may be very tired. It can take time to recover from a vaginal delivery. You may stay in the hospital or birth center from 1 to 4 days. In some cases, you may be able to go home the same day.  Right after the delivery  Your temperature and blood pressure will be taken until they are stable. A nurse or other healthcare provider will observe you as you rest. You may have afterbirth pains. These are cramps caused by the uterus shrinking. Sanitary pads are used to soak up the discharge of the uterine lining. To make sure that you aren t bleeding too much, the pad will be checked. And the firmness of your uterus will be checked. To do this, a nurse will gently push down on your stomach. If you had anesthesia, you ll be watched closely until you can feel and move your toes. If you have perineal pain (pain between the vagina and anus), an ice pack can help.   care  While still in the hospital or birth center, you ll learn  how to hold and feed your baby. You will also be given instructions on how to care for your baby. This includes bathing and feeding.   Preparing to go home  You may be anxious to go home as soon as possible. Before you and your baby go home, a healthcare provider will check to be sure you are healthy enough to take care of your baby and yourself. You re ready to go home when:    You can walk to the bathroom and use the bathroom without help.    You can eat solid food and swallow pills (if needed).    You have no sign of infection or other health problems, including fever.     You have adequate pain control.    Your bleeding isn't excessive.    You are able to care for your  and are emotionally stable.  Before leaving the hospital or birth center, you ll be given written instructions for home self-care after vaginal delivery. Be sure to follow these instructions carefully. If you have questions or concerns, talk about them now.  If you have stitches  You may have received stitches in the skin near your vagina. The stitches might have closed an episiotomy (an incision that enlarges the opening of the vagina). Or you may have needed stitches to repair torn skin. Either way, your stitches should dissolve within weeks. Until then, you can help reduce discomfort, aid healing, and reduce your risk of infection by keeping the stitches clean. These tips can help:    Gently wipe from front to back after you urinate or have a bowel movement.    After wiping, spray warm water on the area. Or you can have a sitz bath. This means sitting in a tub with a few inches of water in it. Then pat the area dry or use a hairdryer on a cool setting.    Do not use soap or any solution except water on the area.    You can take a shower unless told not to.    Change sanitary pads at least every 2 to 4 hours.    Place cold or heat packs on the area as directed by your healthcare providers or nurses. Keep a thin towel between the pack and  your skin.    Sit on firm seats so the stitches pull less.   follow-up  Schedule a  follow-up exam with your healthcare provider for about 6 weeks after delivery. During this exam, your uterus and vaginal area will be checked. Contact your healthcare provider if you think you or your baby are having any problems.  When to call your healthcare provider  Call your healthcare provider right away if you have:    A fever of 100.4 F (38.0 C) or higher    Bleeding that needs a new sanitary pad after an hour, or large blood clots    Pain in your vagina that gets worse and isn't relieved with medicine    Swelling, discharge, or increased pain from vaginal tear or episiotomy    Burning, pain, red streaks, or lumpy areas in your breasts that may be accompanied by flu-like symptoms    Cracks, blisters, or blood on your nipples    Burning or pain when you urinate    Nausea or vomiting    Dizziness or fainting    Feelings of extreme sadness or anxiety, or a feeling that you don t want to be with your baby    Belly pain that isn t relieved with medicine    Vaginal discharge that has a bad odor    No bowel movement for 5 days    Painful urination, or inability to control urination    Redness, warmth, or pain in the lower leg    Chest pain   Date Last Reviewed: 2017-2017 The [employer]. [address] [place] [address], [place] [address]. All rights reserved. This information is not intended as a substitute for professional medical care. Always follow your healthcare professional's instructions.                Breastfeeding: Caring for Yourself  When you have a new little person in your life, it s easy to forget about yourself. There are new demands on your time. There are also new responsibilities. But it s important to take care of yourself. This will help you take better care of your new baby.    Healthy habits  Here are some healthy tips:    Get exercise when you can. If you leak milk, it will  help to nurse right before the activity.    Avoid smoking. Smoking is unhealthy for you and may cause you to make less milk. Secondhand smoke is also harmful to your baby.    Talk to your healthcare provider about alcohol, if you choose to drink.    When you re sick, tell your healthcare provider that you are breastfeeding. Few medicines and illnesses affect breastfeeding, but it is important to check.    Ask your healthcare provider before taking any prescription or over-the-counter medicines, herbs, or supplements.  Comfy clothes  Suggestions for being comfortable when breastfeeding include:    Find a comfortable nursing bra. Many women find underwire uncomfortable. Some stores offer on-site fittings. Ask your healthcare provider or nurse for a referral.    If you have leaking milk, place breast pads inside your bra.    Choose an extra-supportive bra for exercise. Or you can wear two bras at the same time for more support.    Wear loose tops that can be lifted for breastfeeding. You can also buy clothes specially made for breastfeeding moms.  A note about sex  After delivery, it may take a while before your interest in sex returns. Share your feelings with your partner. Your healthcare provider will let you know when it is safe to resume having sex. When you re ready, know that:    There are several forms of birth control that can be used while breastfeeding. Ask your healthcare provider what to use for pregnancy prevention while you are nursing.    Breastfeeding hormones may cause vaginal dryness. Some women find using a water-based lubricant makes sex more comfortable.    Milk may let down when you are aroused. Applying pressure on the nipple, using breast pads, or a towel may help with this.  When to call your healthcare provider  Call your healthcare provider if:    You feel overwhelmed and don't know where to turn.    You feel very sad or don t want to be with your baby.    You feel like your baby cries all  the time and won't be soothed    You are unable to exercise, or have sex, without discomfort.    You are unsure about a medicine, illness, or activity and its effect on breastfeeding.   Date Last Reviewed: 9/7/2015 2000-2017 [employer]. [address] [place] [address]. All rights reserved. This information is not intended as a substitute for professional medical care. Always follow your healthcare professional's instructions.                Nutrition While Breastfeeding  Do I need a special diet for breastfeeding?  You don't have to eat a special diet to make enough milk for your baby. Also, your milk will be of good quality for your baby regardless of what you eat. But your body needs fuel to make breastmilk. So eat your fill of a variety of foods. Breastfeeding isn t an excuse to eat and drink everything you want. But it s not a reason to avoid favorite foods either.   Healthy diet for the new mother  A healthy diet is recommended for all women and offers many benefits to the new mother. Choosing a variety of healthy foods creates a pattern for the entire family. Each family member benefits. Women who are breastfeeding need about 500 extra calories per day. Some women might need more, while others might need less. When choosing foods, use the nutrition chart below as a guide.    Bread, cereal, rice, and pasta Vegetables Fruit   Milk, yogurt, and cheese Meat, poultry, fish,  dry beans, eggs, and nuts Fats, oils, and sweets  (use sparingly)   What s good for you?  Here are some things to do:    Breastfeeding women need to drink when they feel thirsty. There is no specific amount of water you need to drink to make enough milk.    Follow healthy eating guidelines.    Snack on fruit or low-fat dairy products if you re hungry between meals.    If your healthcare provider recommends it, keep taking prenatal vitamins.  What s not good for you?  Here are other things to consider:    Limit fatty  foods and foods that are high in sugar (cookies, cakes).    Be aware that what enters your body may pass into your breastmilk. Limit caffeine. It is not just in coffee. It is also in cola, tea, and chocolate.    Limit the amount of fish that may contain mercury, such as shark and swordfish.    Talk with your healthcare provider before taking any medicines. It is important to let your healthcare provider know that you are nursing. Some medicines are not safe with breastfeeding.    Remember: Alcohol, cigarettes, and drugs also affect your breastmilk and your baby. Talk with your healthcare provider.  Date Last Reviewed: 1/1/2018 2000-2017 [employer]. [address] [place] [address], [place] [address]. All rights reserved. This information is not intended as a substitute for professional medical care. Always follow your healthcare professional's instructions.

## 2018-10-05 NOTE — ANESTHESIA PROCEDURE NOTES
Peripheral nerve/Neuraxial procedure note : epidural catheter  Pre-Procedure  Performed by STEPHEN MARMOLEJO  Location: OB      Pre-Anesthestic Checklist: patient identified, IV checked, risks and benefits discussed, informed consent, monitors and equipment checked and pre-op evaluation    Timeout  Correct Patient: Yes   Correct Procedure: Yes   Correct Site: Yes   Correct Laterality: N/A   Correct Position: Yes   Site Marked: N/A   .   Procedure Documentation    .    Procedure:    Epidural catheter.  Insertion Site:L3-4  (midline approach) Injection technique: LORT saline   Local skin infiltrated with mL of 1% lidocaine.       Patient Prep;mask, sterile gloves, povidone-iodine 7.5% surgical scrub, patient draped.  .  Needle: Touhy needle Needle Gauge: 17.    Needle Length (Inches) 3.5  # of attempts: 1 and # of redirects:  .   . .  Catheter threaded easily  .  10 cm at skin.   .    Assessment/Narrative  Paresthesias: No.  .  .  Aspiration negative for heme or CSF  . Test dose of 3 mL lidocaine 1.5% w/ 1:200,000 epinephrine at. Test dose negative for signs of intravascular, subdural or intrathecal injection. Comments:  No complications, patient tolerated the procedure well.  Sterile dressing placed.

## 2018-10-05 NOTE — PROVIDER NOTIFICATION
FHR down to 80s-90s for approx 8 min. MD at bedside about long term through decel. FHR dropped when turned Patient to left side, pitocin stopped, O2 applied via nonrebreather mask. Per MD, Patient complete. FHT back to 120s. Patient comfortable on right side. Plan to labor down until Patient feeling more pressure.

## 2018-10-06 ENCOUNTER — DOCUMENTATION ONLY (OUTPATIENT)
Dept: CARE COORDINATION | Facility: CLINIC | Age: 33
End: 2018-10-06

## 2018-10-06 VITALS
OXYGEN SATURATION: 95 % | TEMPERATURE: 97.8 F | SYSTOLIC BLOOD PRESSURE: 121 MMHG | DIASTOLIC BLOOD PRESSURE: 84 MMHG | RESPIRATION RATE: 16 BRPM

## 2018-10-06 PROCEDURE — 25000132 ZZH RX MED GY IP 250 OP 250 PS 637: Performed by: OBSTETRICS & GYNECOLOGY

## 2018-10-06 RX ADMIN — ACETAMINOPHEN 650 MG: 325 TABLET, FILM COATED ORAL at 02:19

## 2018-10-06 RX ADMIN — SENNOSIDES AND DOCUSATE SODIUM 1 TABLET: 8.6; 5 TABLET ORAL at 08:37

## 2018-10-06 RX ADMIN — PRENATAL VIT W/ FE FUMARATE-FA TAB 27-0.8 MG 1 TABLET: 27-0.8 TAB at 08:37

## 2018-10-06 RX ADMIN — ACETAMINOPHEN 650 MG: 325 TABLET, FILM COATED ORAL at 06:47

## 2018-10-06 RX ADMIN — IBUPROFEN 800 MG: 400 TABLET ORAL at 06:47

## 2018-10-06 RX ADMIN — ACETAMINOPHEN 650 MG: 325 TABLET, FILM COATED ORAL at 13:59

## 2018-10-06 RX ADMIN — IBUPROFEN 800 MG: 400 TABLET ORAL at 13:59

## 2018-10-06 NOTE — LACTATION NOTE
Initial Lactation visit.  Recommend unlimited, frequent breast feedings: At least 8 - 12 times every 24 hours. Avoid pacifiers and supplementation with formula unless medically indicated. Explained benefits of holding baby skin on skin to help promote better breastfeeding outcomes.   Infant is feeding well.  Judith is feeling good about latching, planning to discharge today if possible.  She has lactation available at peds upon discharge.  She had no questions or concerns. She said she was pleasantly surprised how much easier things were going this time.     Will revisit as needed.    Maria G Charles RN, IBCLC

## 2018-10-06 NOTE — PROGRESS NOTES
[facility] will be sharing updates with you on Maternal Child Health Referral requests for home care services.  This is for care coordination purposes and alert you to referral status.  We received the referral for  Judith Fuentes; MRN [id number] and want to update you:    Home visit for postpartum/  assessment and education offered to patient.  Patient declined need for homecare visit.  Advised to follow up with Primary Care Providers for mom and baby.     Sincerely [facility]  David Easong  [phone]

## 2018-10-06 NOTE — PLAN OF CARE
Problem: Patient Care Overview  Goal: Plan of Care/Patient Progress Review  Outcome: Improving  Vital signs stable. Patient voiding without difficulty. Able to ambulate in room free of dizziness. Taking tylenol and ibuprofen for pain management. Working on breastfeeding infant every 2-3 hours. Encouraged to call with questions or concerns. Will continue to monitor.

## 2018-10-06 NOTE — PROGRESS NOTES
OB Vaginal Delivery Progress Note    Patient name: Judith Fuentes  : 1985  Admit date: 10/5/2018  MRN: [id number]  Acct: [id number]      Assessment/Plan:  Judith Fuentes is a  who is PPD#1 from .    - gHTN: single mild-range BP since delivery which was normal on recheck 10min later. Essentially all normal range since delivery. Pt asx, no si/sx preE. Pt strongly desires discharge home today and will monitor for si/sx. Pt aware of rec for BP check in 1w  - Blood type: O pos, no need for Rhogam  - VMI (Fareed); breast feeding  - Cont routine PP care. Anticipate d/c home today       Subjective:  The patient did well overnight. There were no acute issues. Her pain is well controlled. She notes appropriate lochia. She is tolerating a regular diet well without nausea or vomiting. She has ambulated. She is voiding without difficulty. She denies dizziness, lightheadedness, headache, vision changes, chest pain, shortness of breath, RUQ pain, calf pain, or other complaints on ROS.    Objective:  Vitals:  Temp:  [97.4  F (36.3  C)-98.4  F (36.9  C)] 97.8  F (36.6  C)  Heart Rate:  [58-76] 76  Resp:  [16-17] 16  BP: (105-144)/(55-84) 121/84  SpO2:  [95 %-100 %] 95 %    Exam:  General: no acute distress  Cardiovascular: pulses intact, no peripheral edema  Pulmonary: no respiratory difficulty, normal non-labored breathing  Abdomen: soft, appropriatly tender to palpation, non-distended  Uterus: fundus firm at U   Extremities: BL lower extremities non-tender, no palpable cords  Psych: mood appropriate      Ty Pickett MD  10/6/2018, 9:05 AM

## 2018-10-06 NOTE — PLAN OF CARE
Problem: Patient Care Overview  Goal: Plan of Care/Patient Progress Review  Outcome: Adequate for Discharge Date Met: 10/06/18  Vitals signs stable. Fundus firm. Pain well controlled with Tylenol & Ibuprofen. Working on breastfeeding every 2-3 hours. Planning on discharging home today. Discharge instructions and follow up discussed. Questions and concerns addressed.

## 2018-10-06 NOTE — PLAN OF CARE
Problem: Patient Care Overview  Goal: Plan of Care/Patient Progress Review  Outcome: Improving  Pt VS and temperature stable during shift; voiding appropriately, bleeding minimal with no clots; episiotomy intact, swollen; pain controlled well with Ibuprofen and Tylenol; using tuks and ice packs for extra pain relief; ambulating ad vee independently; fundus firm at U/U; breastfeeding infant well; parents bonding well with infant; instructed to call with any questions or concerns; will continue to monitor

## 2018-10-07 NOTE — ANESTHESIA POSTPROCEDURE EVALUATION
Patient: Judith Fuentes    * No procedures listed *    Diagnosis:* No pre-op diagnosis entered *  Diagnosis Additional Information: No value filed.    Anesthesia Type:  No value filed.    Note:  Anesthesia Post Evaluation       Anesthetic complications: None          Last vitals:  Vitals:    10/05/18 2004 10/06/18 0200 10/06/18 0838   BP: 121/77 114/78 121/84   Resp: 17 16 16   Temp: 36.5  C (97.7  F) 36.3  C (97.4  F) 36.6  C (97.8  F)   SpO2:            Electronically Signed By: Tamika Thompson  October 7, 2018  12:22 AM

## 2023-07-10 ENCOUNTER — EXTERNAL ORDER RESULTS (OUTPATIENT)
Dept: LAB | Facility: CLINIC | Age: 38
End: 2023-07-10

## 2024-01-15 LAB
% BASOPHILS (EXTERNAL): 0 %
% EOSINOPHILS (EXTERNAL): 1 %
% IMMATURE GRANULOCYTES (EXTERNAL): 1 %
% LYMPHOCYTES (EXTERNAL): 11 %
% MONOCYTES (EXTERNAL): 5 %
% NEUTROPHILS (EXTERNAL): 82 %
ABSOLUTE BASOPHILS (EXTERNAL): 0 (ref 0–0.2)
ABSOLUTE EOSINOPHILS (EXTERNAL): 0.1 (ref 0–0.4)
ABSOLUTE IMMATURE GRANULOCYTES (EXTERNAL): 0.1 (ref 0–0.1)
ABSOLUTE LYMPHOCYTES (EXTERNAL): 0.9 (ref 0.7–3.1)
ABSOLUTE MONOCYTES (EXTERNAL): 0.4 (ref 0.1–0.9)
ABSOLUTE NEUTROPHILS (EXTERNAL): 6.6 (ref 1.4–7)
C TRACH DNA SPEC QL PROBE+SIG AMP: NEGATIVE
ERYTHROCYTE [DISTWIDTH] IN BLOOD BY AUTOMATED COUNT: 12 % (ref 11.7–15)
HEMATOCRIT (EXTERNAL): 34.8 (ref 34–46.6)
HEMOGLOBIN (EXTERNAL): 11.7 (ref 11.1–15.9)
MCH RBC QN AUTO: 31.4 PG (ref 28.6–33)
MCHC RBC AUTO-ENTMCNC: 33.6 G/DL (ref 31.5–35.7)
MCV RBC AUTO: 93 FL (ref 79–97)
N GONORRHOEA DNA SPEC QL PROBE+SIG AMP: NEGATIVE
PLATELET COUNT (EXTERNAL): 159 (ref 150–450)
RBC # BLD AUTO: 3.73 10*6/UL (ref 3.77–5.28)
SCAN LAB RESULTS (EXTERNAL): NORMAL
URINE CULTURE OB NURSE INTERPRETATION (EXTERNAL RESULT): NO GROWTH
WBC COUNT (EXTERNAL): 8.1 (ref 3.4–10.8)

## 2024-01-17 ENCOUNTER — HOSPITAL ENCOUNTER (INPATIENT)
Facility: CLINIC | Age: 39
LOS: 1 days | Discharge: HOME | End: 2024-01-18
Attending: OBSTETRICS & GYNECOLOGY | Admitting: OBSTETRICS & GYNECOLOGY
Payer: OTHER GOVERNMENT

## 2024-01-17 ENCOUNTER — ANESTHESIA (OUTPATIENT)
Dept: OBGYN | Facility: CLINIC | Age: 39
End: 2024-01-17
Payer: OTHER GOVERNMENT

## 2024-01-17 ENCOUNTER — ANESTHESIA EVENT (OUTPATIENT)
Dept: OBGYN | Facility: CLINIC | Age: 39
End: 2024-01-17
Payer: OTHER GOVERNMENT

## 2024-01-17 LAB
ABO/RH(D): NORMAL
ANTIBODY SCREEN: NEGATIVE
BASOPHILS # BLD AUTO: 0 10E3/UL (ref 0–0.2)
BASOPHILS NFR BLD AUTO: 0 %
EOSINOPHIL # BLD AUTO: 0.1 10E3/UL (ref 0–0.7)
EOSINOPHIL NFR BLD AUTO: 1 %
ERYTHROCYTE [DISTWIDTH] IN BLOOD BY AUTOMATED COUNT: 12.4 % (ref 10–15)
HCT VFR BLD AUTO: 36.1 % (ref 35–47)
HGB BLD-MCNC: 12.3 G/DL (ref 11.7–15.7)
IMM GRANULOCYTES # BLD: 0.1 10E3/UL
IMM GRANULOCYTES NFR BLD: 2 %
LYMPHOCYTES # BLD AUTO: 0.9 10E3/UL (ref 0.8–5.3)
LYMPHOCYTES NFR BLD AUTO: 10 %
MCH RBC QN AUTO: 31.2 PG (ref 26.5–33)
MCHC RBC AUTO-ENTMCNC: 34.1 G/DL (ref 31.5–36.5)
MCV RBC AUTO: 92 FL (ref 78–100)
MONOCYTES # BLD AUTO: 0.8 10E3/UL (ref 0–1.3)
MONOCYTES NFR BLD AUTO: 9 %
NEUTROPHILS # BLD AUTO: 7 10E3/UL (ref 1.6–8.3)
NEUTROPHILS NFR BLD AUTO: 78 %
NRBC # BLD AUTO: 0 10E3/UL
NRBC BLD AUTO-RTO: 0 /100
PLATELET # BLD AUTO: 143 10E3/UL (ref 150–450)
RBC # BLD AUTO: 3.94 10E6/UL (ref 3.8–5.2)
SPECIMEN EXPIRATION DATE: NORMAL
T PALLIDUM AB SER QL: NONREACTIVE
WBC # BLD AUTO: 9 10E3/UL (ref 4–11)

## 2024-01-17 PROCEDURE — 722N000001 HC LABOR CARE VAGINAL DELIVERY SINGLE

## 2024-01-17 PROCEDURE — 10907ZC DRAINAGE OF AMNIOTIC FLUID, THERAPEUTIC FROM PRODUCTS OF CONCEPTION, VIA NATURAL OR ARTIFICIAL OPENING: ICD-10-PCS | Performed by: OBSTETRICS & GYNECOLOGY

## 2024-01-17 PROCEDURE — 250N000013 HC RX MED GY IP 250 OP 250 PS 637: Performed by: OBSTETRICS & GYNECOLOGY

## 2024-01-17 PROCEDURE — 86780 TREPONEMA PALLIDUM: CPT | Performed by: OBSTETRICS & GYNECOLOGY

## 2024-01-17 PROCEDURE — 250N000011 HC RX IP 250 OP 636: Performed by: ANESTHESIOLOGY

## 2024-01-17 PROCEDURE — 120N000012 HC R&B POSTPARTUM

## 2024-01-17 PROCEDURE — 370N000003 HC ANESTHESIA WARD SERVICE: Performed by: ANESTHESIOLOGY

## 2024-01-17 PROCEDURE — 999N000016 HC STATISTIC ATTENDANCE AT DELIVERY

## 2024-01-17 PROCEDURE — 258N000003 HC RX IP 258 OP 636: Performed by: OBSTETRICS & GYNECOLOGY

## 2024-01-17 PROCEDURE — 85025 COMPLETE CBC W/AUTO DIFF WBC: CPT | Performed by: OBSTETRICS & GYNECOLOGY

## 2024-01-17 PROCEDURE — 00HU33Z INSERTION OF INFUSION DEVICE INTO SPINAL CANAL, PERCUTANEOUS APPROACH: ICD-10-PCS | Performed by: ANESTHESIOLOGY

## 2024-01-17 PROCEDURE — 3E0R3BZ INTRODUCTION OF ANESTHETIC AGENT INTO SPINAL CANAL, PERCUTANEOUS APPROACH: ICD-10-PCS | Performed by: ANESTHESIOLOGY

## 2024-01-17 PROCEDURE — 86900 BLOOD TYPING SEROLOGIC ABO: CPT | Performed by: OBSTETRICS & GYNECOLOGY

## 2024-01-17 PROCEDURE — 250N000009 HC RX 250: Performed by: OBSTETRICS & GYNECOLOGY

## 2024-01-17 PROCEDURE — 3E033VJ INTRODUCTION OF OTHER HORMONE INTO PERIPHERAL VEIN, PERCUTANEOUS APPROACH: ICD-10-PCS | Performed by: OBSTETRICS & GYNECOLOGY

## 2024-01-17 RX ORDER — LOPERAMIDE HCL 2 MG
2 CAPSULE ORAL
Status: DISCONTINUED | OUTPATIENT
Start: 2024-01-17 | End: 2024-01-18 | Stop reason: HOSPADM

## 2024-01-17 RX ORDER — IBUPROFEN 400 MG/1
800 TABLET, FILM COATED ORAL EVERY 6 HOURS PRN
Status: DISCONTINUED | OUTPATIENT
Start: 2024-01-17 | End: 2024-01-18 | Stop reason: HOSPADM

## 2024-01-17 RX ORDER — SODIUM CHLORIDE, SODIUM LACTATE, POTASSIUM CHLORIDE, CALCIUM CHLORIDE 600; 310; 30; 20 MG/100ML; MG/100ML; MG/100ML; MG/100ML
INJECTION, SOLUTION INTRAVENOUS CONTINUOUS PRN
Status: DISCONTINUED | OUTPATIENT
Start: 2024-01-17 | End: 2024-01-17 | Stop reason: HOSPADM

## 2024-01-17 RX ORDER — KETOROLAC TROMETHAMINE 30 MG/ML
30 INJECTION, SOLUTION INTRAMUSCULAR; INTRAVENOUS
Status: DISCONTINUED | OUTPATIENT
Start: 2024-01-17 | End: 2024-01-18 | Stop reason: HOSPADM

## 2024-01-17 RX ORDER — METHYLERGONOVINE MALEATE 0.2 MG/ML
200 INJECTION INTRAVENOUS
Status: DISCONTINUED | OUTPATIENT
Start: 2024-01-17 | End: 2024-01-18 | Stop reason: HOSPADM

## 2024-01-17 RX ORDER — ONDANSETRON 2 MG/ML
4 INJECTION INTRAMUSCULAR; INTRAVENOUS EVERY 6 HOURS PRN
Status: DISCONTINUED | OUTPATIENT
Start: 2024-01-17 | End: 2024-01-17 | Stop reason: HOSPADM

## 2024-01-17 RX ORDER — BISACODYL 10 MG
10 SUPPOSITORY, RECTAL RECTAL DAILY PRN
Status: DISCONTINUED | OUTPATIENT
Start: 2024-01-17 | End: 2024-01-18 | Stop reason: HOSPADM

## 2024-01-17 RX ORDER — LOPERAMIDE HCL 2 MG
2 CAPSULE ORAL
Status: DISCONTINUED | OUTPATIENT
Start: 2024-01-17 | End: 2024-01-17 | Stop reason: HOSPADM

## 2024-01-17 RX ORDER — ROPIVACAINE HYDROCHLORIDE 2 MG/ML
INJECTION, SOLUTION EPIDURAL; INFILTRATION; PERINEURAL
Status: COMPLETED | OUTPATIENT
Start: 2024-01-17 | End: 2024-01-17

## 2024-01-17 RX ORDER — TRANEXAMIC ACID 10 MG/ML
1 INJECTION, SOLUTION INTRAVENOUS EVERY 30 MIN PRN
Status: DISCONTINUED | OUTPATIENT
Start: 2024-01-17 | End: 2024-01-17 | Stop reason: HOSPADM

## 2024-01-17 RX ORDER — LOPERAMIDE HCL 2 MG
4 CAPSULE ORAL
Status: DISCONTINUED | OUTPATIENT
Start: 2024-01-17 | End: 2024-01-18 | Stop reason: HOSPADM

## 2024-01-17 RX ORDER — PROCHLORPERAZINE 25 MG
25 SUPPOSITORY, RECTAL RECTAL EVERY 12 HOURS PRN
Status: DISCONTINUED | OUTPATIENT
Start: 2024-01-17 | End: 2024-01-17 | Stop reason: HOSPADM

## 2024-01-17 RX ORDER — ONDANSETRON 4 MG/1
4 TABLET, ORALLY DISINTEGRATING ORAL EVERY 6 HOURS PRN
Status: DISCONTINUED | OUTPATIENT
Start: 2024-01-17 | End: 2024-01-17 | Stop reason: HOSPADM

## 2024-01-17 RX ORDER — LIDOCAINE 40 MG/G
CREAM TOPICAL
Status: DISCONTINUED | OUTPATIENT
Start: 2024-01-17 | End: 2024-01-17 | Stop reason: HOSPADM

## 2024-01-17 RX ORDER — NALOXONE HYDROCHLORIDE 0.4 MG/ML
0.2 INJECTION, SOLUTION INTRAMUSCULAR; INTRAVENOUS; SUBCUTANEOUS
Status: DISCONTINUED | OUTPATIENT
Start: 2024-01-17 | End: 2024-01-17 | Stop reason: HOSPADM

## 2024-01-17 RX ORDER — NALBUPHINE HYDROCHLORIDE 20 MG/ML
2.5-5 INJECTION, SOLUTION INTRAMUSCULAR; INTRAVENOUS; SUBCUTANEOUS EVERY 6 HOURS PRN
Status: DISCONTINUED | OUTPATIENT
Start: 2024-01-17 | End: 2024-01-18 | Stop reason: HOSPADM

## 2024-01-17 RX ORDER — MISOPROSTOL 200 UG/1
400 TABLET ORAL
Status: DISCONTINUED | OUTPATIENT
Start: 2024-01-17 | End: 2024-01-18 | Stop reason: HOSPADM

## 2024-01-17 RX ORDER — CITRIC ACID/SODIUM CITRATE 334-500MG
30 SOLUTION, ORAL ORAL
Status: DISCONTINUED | OUTPATIENT
Start: 2024-01-17 | End: 2024-01-17 | Stop reason: HOSPADM

## 2024-01-17 RX ORDER — OXYTOCIN/0.9 % SODIUM CHLORIDE 30/500 ML
340 PLASTIC BAG, INJECTION (ML) INTRAVENOUS CONTINUOUS PRN
Status: DISCONTINUED | OUTPATIENT
Start: 2024-01-17 | End: 2024-01-17 | Stop reason: HOSPADM

## 2024-01-17 RX ORDER — OXYTOCIN/0.9 % SODIUM CHLORIDE 30/500 ML
1-24 PLASTIC BAG, INJECTION (ML) INTRAVENOUS CONTINUOUS
Status: DISCONTINUED | OUTPATIENT
Start: 2024-01-17 | End: 2024-01-17 | Stop reason: HOSPADM

## 2024-01-17 RX ORDER — ACETAMINOPHEN 325 MG/1
650 TABLET ORAL EVERY 4 HOURS PRN
Status: DISCONTINUED | OUTPATIENT
Start: 2024-01-17 | End: 2024-01-18 | Stop reason: HOSPADM

## 2024-01-17 RX ORDER — DOCUSATE SODIUM 100 MG/1
100 CAPSULE, LIQUID FILLED ORAL DAILY
Status: DISCONTINUED | OUTPATIENT
Start: 2024-01-17 | End: 2024-01-18 | Stop reason: HOSPADM

## 2024-01-17 RX ORDER — OXYTOCIN 10 [USP'U]/ML
10 INJECTION, SOLUTION INTRAMUSCULAR; INTRAVENOUS
Status: DISCONTINUED | OUTPATIENT
Start: 2024-01-17 | End: 2024-01-18 | Stop reason: HOSPADM

## 2024-01-17 RX ORDER — MISOPROSTOL 200 UG/1
800 TABLET ORAL
Status: DISCONTINUED | OUTPATIENT
Start: 2024-01-17 | End: 2024-01-18 | Stop reason: HOSPADM

## 2024-01-17 RX ORDER — MODIFIED LANOLIN
OINTMENT (GRAM) TOPICAL
Status: DISCONTINUED | OUTPATIENT
Start: 2024-01-17 | End: 2024-01-18 | Stop reason: HOSPADM

## 2024-01-17 RX ORDER — NALOXONE HYDROCHLORIDE 0.4 MG/ML
0.4 INJECTION, SOLUTION INTRAMUSCULAR; INTRAVENOUS; SUBCUTANEOUS
Status: DISCONTINUED | OUTPATIENT
Start: 2024-01-17 | End: 2024-01-17 | Stop reason: HOSPADM

## 2024-01-17 RX ORDER — LOPERAMIDE HCL 2 MG
4 CAPSULE ORAL
Status: DISCONTINUED | OUTPATIENT
Start: 2024-01-17 | End: 2024-01-17 | Stop reason: HOSPADM

## 2024-01-17 RX ORDER — METHYLERGONOVINE MALEATE 0.2 MG/ML
200 INJECTION INTRAVENOUS
Status: DISCONTINUED | OUTPATIENT
Start: 2024-01-17 | End: 2024-01-17 | Stop reason: HOSPADM

## 2024-01-17 RX ORDER — MISOPROSTOL 200 UG/1
800 TABLET ORAL
Status: DISCONTINUED | OUTPATIENT
Start: 2024-01-17 | End: 2024-01-17 | Stop reason: HOSPADM

## 2024-01-17 RX ORDER — CARBOPROST TROMETHAMINE 250 UG/ML
250 INJECTION, SOLUTION INTRAMUSCULAR
Status: DISCONTINUED | OUTPATIENT
Start: 2024-01-17 | End: 2024-01-18 | Stop reason: HOSPADM

## 2024-01-17 RX ORDER — OXYTOCIN/0.9 % SODIUM CHLORIDE 30/500 ML
340 PLASTIC BAG, INJECTION (ML) INTRAVENOUS CONTINUOUS PRN
Status: DISCONTINUED | OUTPATIENT
Start: 2024-01-17 | End: 2024-01-18 | Stop reason: HOSPADM

## 2024-01-17 RX ORDER — PROCHLORPERAZINE MALEATE 5 MG
10 TABLET ORAL EVERY 6 HOURS PRN
Status: DISCONTINUED | OUTPATIENT
Start: 2024-01-17 | End: 2024-01-17 | Stop reason: HOSPADM

## 2024-01-17 RX ORDER — ROPIVACAINE HYDROCHLORIDE 2 MG/ML
10 INJECTION, SOLUTION EPIDURAL; INFILTRATION; PERINEURAL ONCE
Status: DISCONTINUED | OUTPATIENT
Start: 2024-01-17 | End: 2024-01-17 | Stop reason: HOSPADM

## 2024-01-17 RX ORDER — METOCLOPRAMIDE 10 MG/1
10 TABLET ORAL EVERY 6 HOURS PRN
Status: DISCONTINUED | OUTPATIENT
Start: 2024-01-17 | End: 2024-01-17 | Stop reason: HOSPADM

## 2024-01-17 RX ORDER — SODIUM CHLORIDE, SODIUM LACTATE, POTASSIUM CHLORIDE, CALCIUM CHLORIDE 600; 310; 30; 20 MG/100ML; MG/100ML; MG/100ML; MG/100ML
INJECTION, SOLUTION INTRAVENOUS CONTINUOUS
Status: DISCONTINUED | OUTPATIENT
Start: 2024-01-17 | End: 2024-01-17 | Stop reason: HOSPADM

## 2024-01-17 RX ORDER — METOCLOPRAMIDE HYDROCHLORIDE 5 MG/ML
10 INJECTION INTRAMUSCULAR; INTRAVENOUS EVERY 6 HOURS PRN
Status: DISCONTINUED | OUTPATIENT
Start: 2024-01-17 | End: 2024-01-17 | Stop reason: HOSPADM

## 2024-01-17 RX ORDER — FENTANYL CITRATE 50 UG/ML
100 INJECTION, SOLUTION INTRAMUSCULAR; INTRAVENOUS
Status: DISCONTINUED | OUTPATIENT
Start: 2024-01-17 | End: 2024-01-17 | Stop reason: HOSPADM

## 2024-01-17 RX ORDER — EPHEDRINE SULFATE 50 MG/ML
5 INJECTION, SOLUTION INTRAMUSCULAR; INTRAVENOUS; SUBCUTANEOUS
Status: DISCONTINUED | OUTPATIENT
Start: 2024-01-17 | End: 2024-01-17 | Stop reason: HOSPADM

## 2024-01-17 RX ORDER — CARBOPROST TROMETHAMINE 250 UG/ML
250 INJECTION, SOLUTION INTRAMUSCULAR
Status: DISCONTINUED | OUTPATIENT
Start: 2024-01-17 | End: 2024-01-17 | Stop reason: HOSPADM

## 2024-01-17 RX ORDER — OXYTOCIN/0.9 % SODIUM CHLORIDE 30/500 ML
100-340 PLASTIC BAG, INJECTION (ML) INTRAVENOUS CONTINUOUS PRN
Status: DISCONTINUED | OUTPATIENT
Start: 2024-01-17 | End: 2024-01-18 | Stop reason: HOSPADM

## 2024-01-17 RX ORDER — IBUPROFEN 400 MG/1
800 TABLET, FILM COATED ORAL
Status: DISCONTINUED | OUTPATIENT
Start: 2024-01-17 | End: 2024-01-18

## 2024-01-17 RX ORDER — HYDROCORTISONE 25 MG/G
CREAM TOPICAL 3 TIMES DAILY PRN
Status: DISCONTINUED | OUTPATIENT
Start: 2024-01-17 | End: 2024-01-18 | Stop reason: HOSPADM

## 2024-01-17 RX ORDER — OXYTOCIN 10 [USP'U]/ML
10 INJECTION, SOLUTION INTRAMUSCULAR; INTRAVENOUS
Status: DISCONTINUED | OUTPATIENT
Start: 2024-01-17 | End: 2024-01-17 | Stop reason: HOSPADM

## 2024-01-17 RX ORDER — MISOPROSTOL 200 UG/1
400 TABLET ORAL
Status: DISCONTINUED | OUTPATIENT
Start: 2024-01-17 | End: 2024-01-17 | Stop reason: HOSPADM

## 2024-01-17 RX ORDER — TRANEXAMIC ACID 10 MG/ML
1 INJECTION, SOLUTION INTRAVENOUS EVERY 30 MIN PRN
Status: DISCONTINUED | OUTPATIENT
Start: 2024-01-17 | End: 2024-01-18 | Stop reason: HOSPADM

## 2024-01-17 RX ADMIN — IBUPROFEN 800 MG: 400 TABLET ORAL at 15:23

## 2024-01-17 RX ADMIN — SODIUM CHLORIDE, POTASSIUM CHLORIDE, SODIUM LACTATE AND CALCIUM CHLORIDE 1000 ML: 600; 310; 30; 20 INJECTION, SOLUTION INTRAVENOUS at 11:31

## 2024-01-17 RX ADMIN — Medication 2 MILLI-UNITS/MIN: at 09:05

## 2024-01-17 RX ADMIN — Medication 340 ML/HR: at 14:22

## 2024-01-17 RX ADMIN — IBUPROFEN 800 MG: 400 TABLET ORAL at 22:01

## 2024-01-17 RX ADMIN — ACETAMINOPHEN 650 MG: 325 TABLET, FILM COATED ORAL at 22:01

## 2024-01-17 RX ADMIN — SODIUM CHLORIDE, POTASSIUM CHLORIDE, SODIUM LACTATE AND CALCIUM CHLORIDE: 600; 310; 30; 20 INJECTION, SOLUTION INTRAVENOUS at 09:02

## 2024-01-17 RX ADMIN — Medication: at 12:12

## 2024-01-17 RX ADMIN — ROPIVACAINE HYDROCHLORIDE 10 ML: 2 INJECTION, SOLUTION EPIDURAL; INFILTRATION at 12:13

## 2024-01-17 RX ADMIN — ACETAMINOPHEN 650 MG: 325 TABLET, FILM COATED ORAL at 15:23

## 2024-01-17 ASSESSMENT — ACTIVITIES OF DAILY LIVING (ADL)
ADLS_ACUITY_SCORE: 18
ADLS_ACUITY_SCORE: 22
ADLS_ACUITY_SCORE: 18
ADLS_ACUITY_SCORE: 22
ADLS_ACUITY_SCORE: 22
ADLS_ACUITY_SCORE: 18

## 2024-01-17 NOTE — ANESTHESIA PROCEDURE NOTES
Epidural catheter Procedure Note    Pre-Procedure   Staff -        Anesthesiologist:  Carlos Alberto Fernández MD       Performed By: anesthesiologist       Location: OB       Pre-Anesthestic Checklist: patient identified, IV checked, risks and benefits discussed, informed consent, monitors and equipment checked, pre-op evaluation and at physician/surgeon's request  Timeout:       Correct Patient: Yes        Correct Procedure: Yes        Correct Site: Yes        Correct Position: Yes   Procedure Documentation  Procedure: epidural catheter       Patient Position: sitting       Patient Prep/Sterile Barriers: sterile gloves, mask, patient draped       Skin prep: Betadine       Local skin infiltrated with 3 mL of 1% lidocaine.        Insertion Site: L3-4. (midline approach).       Technique: LORT saline        MICA at 4 cm.       Needle Type: ToBlockTraily needle       Needle Gauge: 17.        Needle Length (Inches): 3.5        Catheter: 19 G.          Catheter threaded easily.           Threaded 8 cm at skin.         # of attempts: 1 and  # of redirects:  0    Assessment/Narrative         Paresthesias: No.       Test dose of 3 mL lidocaine 1.5% w/ 1:200,000 epinephrine at 12:08 CST.         Test dose negative, 3 minutes after injection, for signs of intravascular, subdural, or intrathecal injection.       Insertion/Infusion Method: LORT saline       Aspiration negative for Heme or CSF via Epidural Catheter.    Medication(s) Administered   0.2% Ropivacaine (Epidural) - EPIDURAL   10 mL - 1/17/2024 12:13:00 PM   Comments:  No complications   Secured with Tegaderm, adhesive spray and tape    Orders to manage the epidural infusion have been entered and, through coordination with the nurse, we will continue to manage and monitor the patient's labor epidural.  We will continuously be available to adjust as needed throughout the entire labor and delivery process.        [facility] [place] [place]:   Pain Team Contact information:  "please page the Pain Team Via [facility]. Search \"Pain\". During daytime hours, please page the attending first. At night please page the resident first.      "

## 2024-01-17 NOTE — PROVIDER NOTIFICATION
01/17/24 1400   Provider Notification   Provider Name/Title Dr. Castañeda   Method of Notification At Bedside   Request Attend Delivery

## 2024-01-17 NOTE — PROVIDER NOTIFICATION
01/17/24 1356   Provider Notification   Provider Name/Title Dr. Castañeda   Method of Notification Electronic Page   Request Attend Delivery

## 2024-01-17 NOTE — ANESTHESIA PREPROCEDURE EVALUATION
Anesthesia Pre-Procedure Evaluation    Patient: Judith Fuentes   MRN: [id number] : 1985        Procedure : * No procedures listed *          Past Medical History:   Diagnosis Date    Anxiety     Hypertension     during pregnancy      Past Surgical History:   Procedure Laterality Date    wisdom teeth  2006      No Known Allergies   Social History     Tobacco Use    Smoking status: Never    Smokeless tobacco: Never   Substance Use Topics    Alcohol use: No      Wt Readings from Last 1 Encounters:   24 78.5 kg (173 lb)        Anesthesia Evaluation            ROS/MED HX  ENT/Pulmonary:    (-) asthma   Neurologic:  - neg neurologic ROS     Cardiovascular:     (+)  - - PIH  -  - -                                      METS/Exercise Tolerance:     Hematologic:     (+)  no anticoagulation therapy, no coagulopathy,             Musculoskeletal:       GI/Hepatic:     (+) GERD,                   Renal/Genitourinary:       Endo:       Psychiatric/Substance Use:       Infectious Disease:       Malignancy:       Other:            Physical Exam    Airway        Mallampati: II   TM distance: > 3 FB   Neck ROM: full     Respiratory Devices and Support         Dental  no notable dental history         Cardiovascular   cardiovascular exam normal          Pulmonary   pulmonary exam normal                OUTSIDE LABS:  CBC:   Lab Results   Component Value Date    WBC 9.0 2024    WBC 6.9 10/05/2018    HGB 12.3 2024    HGB 12.2 10/05/2018    HCT 36.1 2024    HCT 36.2 10/05/2018     (L) 2024     (L) 10/05/2018     BMP:   Lab Results   Component Value Date     10/05/2018     10/02/2018    POTASSIUM 4.1 10/05/2018    POTASSIUM 3.9 10/02/2018    CHLORIDE 101 2023    CHLORIDE 107 10/05/2018    CO2 24 10/05/2018    CO2 22 10/02/2018    BUN 7 10/05/2018    BUN 7 10/02/2018    CR 0.52 10/05/2018    CR 0.52 10/02/2018    GLC 76 10/05/2018    GLC 81 10/02/2018     COAGS: No  "results found for: \"PTT\", \"INR\", \"FIBR\"  POC: No results found for: \"BGM\", \"HCG\", \"HCGS\"  HEPATIC:   Lab Results   Component Value Date    ALBUMIN 2.7 (L) 10/05/2018    PROTTOTAL 6.7 (L) 10/05/2018    ALT 15 10/05/2018    AST 18 10/05/2018    ALKPHOS 112 10/05/2018    BILITOTAL 0.3 10/05/2018     OTHER:   Lab Results   Component Value Date    AILEEN 8.2 (L) 10/05/2018       Anesthesia Plan    ASA Status:  2       Anesthesia Type: Epidural.              Consents    Anesthesia Plan(s) and associated risks, benefits, and realistic alternatives discussed. Questions answered and patient/representative(s) expressed understanding.     - Discussed:     - Discussed with:  Patient            Postoperative Care            Comments:    Other Comments: Orders to manage the epidural infusion have been entered, and through coordination with the nurse, we will continute to manage and monitor the patient's labor epidural.  We will continuously be available to adjust as needed thruout the entire L&D process.            Carlos Alberto Fernández MD    I have reviewed the pertinent notes and labs in the chart from the past 30 days and (re)examined the patient.  Any updates or changes from those notes are reflected in this note.                  "

## 2024-01-17 NOTE — PROGRESS NOTES
Data: Judith Fuentes transferred to 422 via wheelchair at 1715. Baby transferred via parent's arms.  Action: Receiving unit notified of transfer: Yes. Patient and family notified of room change. Report given to Evi GRANT RN at 1715. Belongings sent to receiving unit. Accompanied by Registered Nurse. Oriented patient to surroundings. Call light within reach. ID bands double-checked with receiving RN.  Response: Patient tolerated transfer and is stable.

## 2024-01-17 NOTE — L&D DELIVERY NOTE
OB Vaginal Delivery Summary    Judith Fuentes   Admission date: 2024  Delivery date:  24  Place of delivery: [facility]    Admission Diagnosis:  IUP @ 39+5    Favorable Packer score    Post-delivery Diagnosis:  S/p     The patient is a 38 year old  at 39w5d  wks gestation admitted to labor and delivery for eIOL.  Her  course was uncomplicated.  The estimate fetal weight is 7#. GBS was negative    Membranes artificially ruptured and the fluid was meconium-stained. Pitocin was administered per protocol. For pain management she had epidural with good relief. During labor the fetal heart tones were Category I.    She progressed to complete dilation around 1400 and began pushing soon after. She had excellent maternal expulsive efforts. Throughout the second stage, fetal heart tones were Category I. At 1417 she delivered a viable female infant with apgars of 8 at 1 min and 9 at 5 minutes.  The infant was immediately placed on the maternal abdomen. The cord was clamped and cut after one minute. Weight withheld due to maternal skin to skin.    The placenta delivered spontaneously and intact.  She received pitocin upon placental delivery.  The estimated blood loss was 50 mL in the under buttocks drape.    The cervix and vagina were inspected.  There was a first degree perineal laceration which was hemostatic without need for repair.   Mother and baby did well and went to normal postpartum and  nursery.      Ruthie Castañeda MD  2024 1403

## 2024-01-17 NOTE — PLAN OF CARE
Data: Patient admitted to room 215 at 0800. Patient is a . Prenatal record reviewed.   OB History    Para Term  AB Living   3 2 2 0 0 2   SAB IAB Ectopic Multiple Live Births   0 0 0 0 2      # Outcome Date GA Lbr Horacio/2nd Weight Sex Delivery Anes PTL Lv   3 Current            2 Term 10/05/18 39w0d 02:00 / 00:45 3.58 kg (7 lb 14.3 oz) M Vag-Spont EPI N RUKHSANA      Name: Fareed      Apgar1: 9  Apgar5: 9   1 Term 16 38w3d 08:45 / 00:25 2.869 kg (6 lb 5.2 oz) M Vag-Spont EPI N RUKHSANA      Name: Wenceslao      Apgar1: 8  Apgar5: 9   .  Medical History:   Past Medical History:   Diagnosis Date    Anxiety     Hypertension     during pregnancy   .  Gestational age 39w5d. Vital signs per doc flowsheet. Fetal movement present. Patient reports Induction Of Labor   as reason for admission. Support persons Ritesh present.  Action: Care of patient assumed at 0800. Verbal consent for EFM, external fetal monitors applied. Admission assessment completed. Patient and support persons educated on labor process. Patient instructed to report change in fetal movement, contractions, vaginal leaking of fluid or bleeding, abdominal pain, or any concerns related to the pregnancy to her nurse/physician. Patient oriented to room, call light in reach.

## 2024-01-17 NOTE — PROVIDER NOTIFICATION
01/17/24 0810   Provider Notification   Provider Name/Title Dr. Jaramillo   Method of Notification At Bedside   Notification Reason Patient Arrived;Membrane Status;SVE;Other (Comment)  (Provider to bedside for SVE, AROM, & to discuss plans for cares for today for induction.)

## 2024-01-17 NOTE — PROVIDER NOTIFICATION
01/17/24 1302   Provider Notification   Provider Name/Title Dr. Jaramillo   Method of Notification Electronic Page   Request Evaluate - Remote   Notification Reason Labor Status;Uterine Activity;Pain;Status Update;SVE  (Provider given pt labor status update, notified of SVE, contraction pattern, rate of IV Oxytocin, Category 1 EFM tracing, & that pt is comfortable now with epidural.)

## 2024-01-17 NOTE — H&P
2024    Judith Fuentes  [id number]            OB Admit History & Physical      CC: induction of labor    HPI:  at 39w5d here for eIOL with adequate bishops score. No complications in pregnancy.   GBS negative    See prenatal for labs.  Rubella Immune, RH positive    Estimated fetal weight= 7 lb       She is a 38 year old   Her OB history:   OB History    Para Term  AB Living   2 2 2 0 0 2   SAB IAB Ectopic Multiple Live Births   0 0 0 0 2      # Outcome Date GA Lbr Horacio/2nd Weight Sex Delivery Anes PTL Lv   2 Term 10/05/18 39w0d 02:00 / 00:45 3.58 kg (7 lb 14.3 oz) M Vag-Spont EPI N RUKHSANA      Name: LEONCIO FUENTES      Apgar1: 9  Apgar5: 9   1 Term 16 38w3d 08:45 / 00:25 2.869 kg (6 lb 5.2 oz) M Vag-Spont EPI N RUKHSANA      Apgar1: 8  Apgar5: 9            Past Medical History:   Diagnosis Date     Hypertension     during pregnancy          Past Surgical History:   Procedure Laterality Date     wisdom teeth  2006         No current outpatient medications on file.       Allergies: Patient has no known allergies.      REVIEW OF SYSTEMS:  NEUROLOGIC:  Negative  EYES:  Negative  ENT:  Negative  GI:  Negative  BREAST:  Negative  :  Negative  GYN:  Negative  CV:  Negative  PULMONARY:  Negative  MUSCULOSKELETAL:  Negative  PSYCH:  Negative        Social History     Socioeconomic History     Marital status:      Spouse name: Not on file     Number of children: Not on file     Years of education: Not on file     Highest education level: Not on file   Occupational History     Not on file   Tobacco Use     Smoking status: Never     Smokeless tobacco: Never   Substance and Sexual Activity     Alcohol use: No     Drug use: No     Sexual activity: Yes     Partners: Male   Other Topics Concern     Not on file   Social History Narrative     Not on file     Social Determinants of Health     Financial Resource Strain: Not on file   Food Insecurity: Not on file   Transportation Needs:  Not on file   Physical Activity: Not on file   Stress: Not on file   Social Connections: Not on file   Interpersonal Safety: Not on file   Housing Stability: Not on file      No family history on file.          Vitals:  There were no vitals taken for this visit.    FHT category I, mod rossana     [place]: none    Alert Awake in NAD  HEENT grossly normal  Neck: no lymphadenopathy or thryoidomegaly  Lungs clear  Heart regular  ABD gravid  Pelvic:  no fluid noted, no blood noted  Cervix is 2 cm / 80 % effaced at -2 station  AROM, clear fluid  EXT:  no edema or calf tenderness  Neuro:  intact    Assessment:  IUP at 39w5d   Induction of labor  GBS negative  AMA    Plan:  admit to labor and delivery  AROM and clear fluid  Epidural when pt desires  Continue care    Yamilka Jaramillo MD MD  Dept of OB/GYN  January 17, 2024

## 2024-01-17 NOTE — PLAN OF CARE
39+5 delivered a viable baby girl vaginally at 1417 after an elective induction.  QBL 59 mL  FF U/U during immediate recovery, light amount of vaginal bleeding.   Pt able to ambulate to bathroom & void 400 mL following delivery.  Pt denies any pain upon completion of 2 hour recovery, pt was given PO Tylenol & Ibuprofen after delivery.  Breastfeeding going very well with good latch & audible suck/swallow.  Pt stable & able to transfer cares to [facility].

## 2024-01-17 NOTE — PROVIDER NOTIFICATION
01/17/24 1319   Provider Notification   Provider Name/Title Dr. Jaramillo   Method of Notification Phone   Request Evaluate - Remote   Notification Reason Labor Status;Uterine Activity;Pain;Status Update;SVE  (Provider given pt labor status update. Dr. Jaramillo states Dr. Castañeda will cover pts care while she is in surgery.)

## 2024-01-18 VITALS
TEMPERATURE: 98.1 F | HEIGHT: 63 IN | DIASTOLIC BLOOD PRESSURE: 76 MMHG | WEIGHT: 169.3 LBS | SYSTOLIC BLOOD PRESSURE: 123 MMHG | HEART RATE: 61 BPM | RESPIRATION RATE: 16 BRPM | BODY MASS INDEX: 30 KG/M2 | OXYGEN SATURATION: 97 %

## 2024-01-18 PROCEDURE — 250N000013 HC RX MED GY IP 250 OP 250 PS 637: Performed by: OBSTETRICS & GYNECOLOGY

## 2024-01-18 RX ADMIN — ACETAMINOPHEN 650 MG: 325 TABLET, FILM COATED ORAL at 10:38

## 2024-01-18 RX ADMIN — IBUPROFEN 800 MG: 400 TABLET ORAL at 10:38

## 2024-01-18 RX ADMIN — IBUPROFEN 800 MG: 400 TABLET ORAL at 03:54

## 2024-01-18 RX ADMIN — ACETAMINOPHEN 650 MG: 325 TABLET, FILM COATED ORAL at 03:54

## 2024-01-18 RX ADMIN — DOCUSATE SODIUM 100 MG: 100 CAPSULE, LIQUID FILLED ORAL at 08:09

## 2024-01-18 ASSESSMENT — ACTIVITIES OF DAILY LIVING (ADL)
ADLS_ACUITY_SCORE: 18
ADLS_ACUITY_SCORE: 22
ADLS_ACUITY_SCORE: 18

## 2024-01-18 NOTE — PLAN OF CARE
Goal Outcome Evaluation:      Plan of Care Reviewed With: patient    Vital signs stable. Postpartum assessment WDL. Pain controlled with tylenol and ibuprofen. Patient voiding without difficulty. Breastfeeding independently. Patient and infant bonding well. Plan of care ongoing.

## 2024-01-18 NOTE — LACTATION NOTE
Follow up Lactation visit with Judith & baby girl Ghazal. Hoping to discharge home this afternoon if baby's 24 hour screenings are WNL. Judith reports feeding is going well so far. She shared she's been working on making sure Ghazal latches deeply with each feeding and got some help with latch checks overnight. LC offered to come back to assist with checking a latch if Judith would like or needs help prior to discharge. She has lanolin at the bedside to use as needed. Judith shared breastfeeding went well with her other children but has gone the best in the very beginning with Ghazal, which she's very happy about and hopeful it'll continue to be good at home.    Discussed cluster feeding, what it is and when to expect it, The Second Night, satiety cues, feeding cues, and reviewed Feeding Log for home use. Encouraged to review Breastfeeding section in Your Guide to Postpartum [facility].    Reviewed milk supply and engorgement. Reviewed typical timeline of milk supply initiation and progression over first 3-5 days postpartum. Discussed comfort measures for engorgement, plugged duct treatment, and warning signs of breast infection. General questions answered regarding pumping, when it's helpful and necessary. Reviewed general recommendation to wait to start pumping until breastfeeding is well established unless there are feeding difficulties or engorgement not relieved by feeding baby or hand expression. Discussed introducing a bottle and recommendation to wait for bottle introduction for 3-4 weeks unless baby needs to supplement for medical reasons.    Feeding plan: Recommend unlimited, frequent breast feedings: At least 8 - 12 times every 24 hours. Avoid pacifiers and supplementation with formula unless medically indicated. Encouraged use of feeding log and to record feedings, and void/stool patterns. Judith has a breast pump for home use. Follow up with [facility]. Reviewed outpatient lactation resources. Judith very  appreciative of visit.    Indu Birch, RN, BSN, MNN, IBCLC

## 2024-01-18 NOTE — PROGRESS NOTES
"Judith Fuentes  2024   PPD1 s/p     S: 38 year old  delivered at 39w5d   Doing well without complaints.  Sore but medication helping.   Lochia moderatre.   Ambulating.  Urinating without issues.  Breastfeeding.    O: /73 (BP Location: Left arm, Patient Position: Semi-Minaya's, Cuff Size: Adult Regular)   Pulse 53   Temp 98.7  F (37.1  C) (Oral)   Resp 16   Ht 1.6 m (5' 3\")   Wt 78.5 kg (173 lb)   SpO2 97%   Breastfeeding Unknown   BMI 30.65 kg/m    Gen: NAD  Abd: soft, NT, ND, FF below U  Ext: NT, nonedematous    A/P:  38 year old  PPD1 s/p  after eIOL  - ibuprofen and acetaminophen PRN pain  - support breastfeeding  - monitor lochia  - encourage ambulation  - regular diet  - routine PP care  - stable for discharge to home today    Ruthie Castañeda MD  Text Page (8am - 5pm)  2024 0757  "

## 2024-01-18 NOTE — DISCHARGE INSTRUCTIONS
Please call the office if you experience  - bleeding through more than a pad per hour persistently  - passage of blood clots greater than the size of [place]  - abnormal vaginal discharge  - temperature greater than 100.4  - inability to tolerate food or fluid  - pain not controlled with oral medications  - persistent headache, vision changes, chest pain, shortness of breath, pain in the upper belly  - significant breast pain  - mood changes that affect your quality of life    Please make an appointment at [facility] in 6 weeks for a postpartum visit.

## 2024-01-18 NOTE — PLAN OF CARE
Goal Outcome Evaluation:      Plan of Care Reviewed With: patient    Overall Patient Progress: improvingOverall Patient Progress: improving         Pt doing well. Fundus and bleeding within normal limits. Pain is well controlled with pain medication. VSS. Voiding and stooling. Plans to discharge this afternoon.  Pt has no questions or concerns at this time.

## 2024-01-18 NOTE — PLAN OF CARE
Patient transferred from labor and delivery at 1748 Report taken from Janis YA. Safety and security, and education reviewed. Baby bands checked. VSS. Fundus firm and midline. Scant flow.  pain controlled with tylenol and ibuprofen working on  Breastfeeding. Ambulating free of dizziness or lightheaded. Voiding without difficulty. Encouraged to call with needs, questions, or concerns. Will continue to monitor.

## 2024-01-18 NOTE — DISCHARGE SUMMARY
[facility]    Discharge Summary  Obstetrics    Date of Admission:  2024  Date of Discharge:  2024  Discharging Provider: Ruthie Castañeda MD    Discharge Diagnoses   S/p     History of Present Illness   Judith Fuentes is a 38 year old  who presented @ 39+5 for eIOL. Pregnancy was uncomplicated. Labor induced with pitocin and amniotomy. She ultimately gave birth to a viable female infant weighing 7#10 oz with Apgars of 8/9.     Hospital Course   The patient's hospital course was unremarkable.  She recovered as anticipated and experienced no post-delivery complications.  On discharge, her pain was well controlled. Vaginal bleeding appropriate.  Voiding without difficulty.  Ambulating well and tolerating a normal diet.  No fevers.  Breastfeeding well.  Infant stable.  She was discharged on post-partum day 1 per her request.    Post-partum hemoglobin:   Hemoglobin   Date Value Ref Range Status   2024 12.3 11.7 - 15.7 g/dL Final   10/05/2018 12.2 11.7 - 15.7 g/dL Final       Ruthie Castañeda MD    2024 1021    Discharge Disposition   Discharged to home   Condition at discharge: Stable    Primary Care Physician   Yamilka Jaramillo    Consultations This Hospital Stay   ANESTHESIOLOGY IP CONSULT  LACTATION IP CONSULT    Discharge Orders      Activity    Activity as tolerated     Reason for your hospital stay    Maternity care     Follow Up    Follow up with provider in 6 weeks for post-delivery check     Diet    Resume previous diet     Discharge Medications   Current Discharge Medication List        CONTINUE these medications which have NOT CHANGED    Details   Prenatal Vit-Fe Fumarate-FA (PRENATAL MULTIVITAMIN  PLUS IRON) 27-0.8 MG TABS Take 1 tablet by mouth daily           Allergies   No Known Allergies    Ruthie Castañeda MD

## 2024-01-18 NOTE — PLAN OF CARE
Goal Outcome Evaluation:       D: VSS, assessments WDL.   I: Pt. received complete discharge paperwork. Pt. was given times of last dose for all discharge medications in writing on discharge medication sheets.  Discharge teaching included home medication, pain management, activity restrictions, postpartum cares, and signs and symptoms of infection.    A: Discharge outcomes on care plan met.  Mother states understanding and comfort with self and baby cares.  P: Pt. discharged to home.  Pt. was discharged with baby, and bands were checked at time of discharge.  Pt. was accompanied by , nurse and baby, and left with personal belongings.  Pt. to follow up with OB per MD order.  Pt. had no further questions at the time of discharge and no unmet needs were identified.

## 2025-01-13 LAB
HEPATITIS B SURFACE ANTIGEN (EXTERNAL): NEGATIVE
TREPONEMA PALLIDUM ANTIBODY (EXTERNAL): NONREACTIVE

## 2025-08-19 ENCOUNTER — HOSPITAL ENCOUNTER (INPATIENT)
Facility: CLINIC | Age: 40
LOS: 2 days | Discharge: HOME | End: 2025-08-21
Attending: OBSTETRICS & GYNECOLOGY | Admitting: OBSTETRICS & GYNECOLOGY
Payer: OTHER GOVERNMENT

## 2025-08-19 ENCOUNTER — ANESTHESIA (OUTPATIENT)
Dept: OBGYN | Facility: CLINIC | Age: 40
End: 2025-08-19
Payer: OTHER GOVERNMENT

## 2025-08-19 ENCOUNTER — ANESTHESIA EVENT (OUTPATIENT)
Dept: OBGYN | Facility: CLINIC | Age: 40
End: 2025-08-19
Payer: OTHER GOVERNMENT

## 2025-08-19 LAB
ABO + RH BLD: NORMAL
BLD GP AB SCN SERPL QL: NEGATIVE
ERYTHROCYTE [DISTWIDTH] IN BLOOD BY AUTOMATED COUNT: 12.4 % (ref 10–15)
HCT VFR BLD AUTO: 35.5 % (ref 35–47)
HGB BLD-MCNC: 12.2 G/DL (ref 11.7–15.7)
MCH RBC QN AUTO: 31.2 PG (ref 26.5–33)
MCHC RBC AUTO-ENTMCNC: 34.4 G/DL (ref 31.5–36.5)
MCV RBC AUTO: 90.8 FL (ref 78–100)
PLATELET # BLD AUTO: 146 10E3/UL (ref 150–450)
RBC # BLD AUTO: 3.91 10E6/UL (ref 3.8–5.2)
SPECIMEN EXP DATE BLD: NORMAL
T PALLIDUM AB SER QL: NONREACTIVE
WBC # BLD AUTO: 6.92 10E3/UL (ref 4–11)

## 2025-08-19 PROCEDURE — 250N000011 HC RX IP 250 OP 636: Performed by: STUDENT IN AN ORGANIZED HEALTH CARE EDUCATION/TRAINING PROGRAM

## 2025-08-19 PROCEDURE — 370N000003 HC ANESTHESIA WARD SERVICE: Performed by: STUDENT IN AN ORGANIZED HEALTH CARE EDUCATION/TRAINING PROGRAM

## 2025-08-19 PROCEDURE — 120N000012 HC R&B POSTPARTUM

## 2025-08-19 PROCEDURE — 250N000013 HC RX MED GY IP 250 OP 250 PS 637: Performed by: OBSTETRICS & GYNECOLOGY

## 2025-08-19 PROCEDURE — 86901 BLOOD TYPING SEROLOGIC RH(D): CPT | Performed by: OBSTETRICS & GYNECOLOGY

## 2025-08-19 PROCEDURE — 85014 HEMATOCRIT: CPT | Performed by: OBSTETRICS & GYNECOLOGY

## 2025-08-19 PROCEDURE — 250N000009 HC RX 250: Performed by: OBSTETRICS & GYNECOLOGY

## 2025-08-19 PROCEDURE — 10907ZC DRAINAGE OF AMNIOTIC FLUID, THERAPEUTIC FROM PRODUCTS OF CONCEPTION, VIA NATURAL OR ARTIFICIAL OPENING: ICD-10-PCS | Performed by: OBSTETRICS & GYNECOLOGY

## 2025-08-19 PROCEDURE — 120N000013 HC R&B IMCU

## 2025-08-19 PROCEDURE — 86780 TREPONEMA PALLIDUM: CPT | Performed by: OBSTETRICS & GYNECOLOGY

## 2025-08-19 PROCEDURE — 258N000003 HC RX IP 258 OP 636: Performed by: OBSTETRICS & GYNECOLOGY

## 2025-08-19 PROCEDURE — 722N000001 HC LABOR CARE VAGINAL DELIVERY SINGLE

## 2025-08-19 PROCEDURE — 3E033VJ INTRODUCTION OF OTHER HORMONE INTO PERIPHERAL VEIN, PERCUTANEOUS APPROACH: ICD-10-PCS | Performed by: OBSTETRICS & GYNECOLOGY

## 2025-08-19 RX ORDER — OXYTOCIN 10 [USP'U]/ML
10 INJECTION, SOLUTION INTRAMUSCULAR; INTRAVENOUS
Status: DISCONTINUED | OUTPATIENT
Start: 2025-08-19 | End: 2025-08-19

## 2025-08-19 RX ORDER — ASPIRIN 81 MG/1
81 TABLET, CHEWABLE ORAL DAILY
Status: ON HOLD | COMMUNITY
End: 2025-08-21

## 2025-08-19 RX ORDER — TERBUTALINE SULFATE 1 MG/ML
0.25 INJECTION SUBCUTANEOUS
Status: DISCONTINUED | OUTPATIENT
Start: 2025-08-19 | End: 2025-08-19 | Stop reason: HOSPADM

## 2025-08-19 RX ORDER — OXYTOCIN/0.9 % SODIUM CHLORIDE 30/500 ML
1-24 PLASTIC BAG, INJECTION (ML) INTRAVENOUS CONTINUOUS
Status: DISCONTINUED | OUTPATIENT
Start: 2025-08-19 | End: 2025-08-19 | Stop reason: HOSPADM

## 2025-08-19 RX ORDER — CARBOPROST TROMETHAMINE 250 UG/ML
250 INJECTION, SOLUTION INTRAMUSCULAR
Status: DISCONTINUED | OUTPATIENT
Start: 2025-08-19 | End: 2025-08-19 | Stop reason: HOSPADM

## 2025-08-19 RX ORDER — METOCLOPRAMIDE 10 MG/1
10 TABLET ORAL EVERY 6 HOURS PRN
Status: DISCONTINUED | OUTPATIENT
Start: 2025-08-19 | End: 2025-08-19 | Stop reason: HOSPADM

## 2025-08-19 RX ORDER — LOPERAMIDE HYDROCHLORIDE 2 MG/1
2 CAPSULE ORAL
Status: DISCONTINUED | OUTPATIENT
Start: 2025-08-19 | End: 2025-08-21 | Stop reason: HOSPADM

## 2025-08-19 RX ORDER — MISOPROSTOL 200 UG/1
800 TABLET ORAL
Status: DISCONTINUED | OUTPATIENT
Start: 2025-08-19 | End: 2025-08-19 | Stop reason: HOSPADM

## 2025-08-19 RX ORDER — ONDANSETRON 4 MG/1
4 TABLET, ORALLY DISINTEGRATING ORAL EVERY 6 HOURS PRN
Status: DISCONTINUED | OUTPATIENT
Start: 2025-08-19 | End: 2025-08-19 | Stop reason: HOSPADM

## 2025-08-19 RX ORDER — ROPIVACAINE HYDROCHLORIDE 2 MG/ML
INJECTION, SOLUTION EPIDURAL; INFILTRATION; PERINEURAL
Status: COMPLETED | OUTPATIENT
Start: 2025-08-19 | End: 2025-08-19

## 2025-08-19 RX ORDER — OXYTOCIN/0.9 % SODIUM CHLORIDE 30/500 ML
340 PLASTIC BAG, INJECTION (ML) INTRAVENOUS CONTINUOUS PRN
Status: DISCONTINUED | OUTPATIENT
Start: 2025-08-19 | End: 2025-08-21 | Stop reason: HOSPADM

## 2025-08-19 RX ORDER — OXYTOCIN 10 [USP'U]/ML
10 INJECTION, SOLUTION INTRAMUSCULAR; INTRAVENOUS
Status: DISCONTINUED | OUTPATIENT
Start: 2025-08-19 | End: 2025-08-21 | Stop reason: HOSPADM

## 2025-08-19 RX ORDER — IBUPROFEN 400 MG/1
800 TABLET, FILM COATED ORAL EVERY 6 HOURS PRN
Status: DISCONTINUED | OUTPATIENT
Start: 2025-08-19 | End: 2025-08-21 | Stop reason: HOSPADM

## 2025-08-19 RX ORDER — OXYTOCIN/0.9 % SODIUM CHLORIDE 30/500 ML
100-340 PLASTIC BAG, INJECTION (ML) INTRAVENOUS CONTINUOUS PRN
Status: DISCONTINUED | OUTPATIENT
Start: 2025-08-19 | End: 2025-08-19

## 2025-08-19 RX ORDER — POLYETHYLENE GLYCOL 3350 17 G/17G
17 POWDER, FOR SOLUTION ORAL DAILY PRN
Status: DISCONTINUED | OUTPATIENT
Start: 2025-08-19 | End: 2025-08-21 | Stop reason: HOSPADM

## 2025-08-19 RX ORDER — MISOPROSTOL 200 UG/1
400 TABLET ORAL
Status: DISCONTINUED | OUTPATIENT
Start: 2025-08-19 | End: 2025-08-21 | Stop reason: HOSPADM

## 2025-08-19 RX ORDER — HYDROCORTISONE 25 MG/G
CREAM TOPICAL 3 TIMES DAILY PRN
Status: DISCONTINUED | OUTPATIENT
Start: 2025-08-19 | End: 2025-08-21 | Stop reason: HOSPADM

## 2025-08-19 RX ORDER — AMOXICILLIN 250 MG
2 CAPSULE ORAL
Status: DISCONTINUED | OUTPATIENT
Start: 2025-08-19 | End: 2025-08-21 | Stop reason: HOSPADM

## 2025-08-19 RX ORDER — OXYTOCIN 10 [USP'U]/ML
10 INJECTION, SOLUTION INTRAMUSCULAR; INTRAVENOUS
Status: DISCONTINUED | OUTPATIENT
Start: 2025-08-19 | End: 2025-08-19 | Stop reason: HOSPADM

## 2025-08-19 RX ORDER — OXYTOCIN/0.9 % SODIUM CHLORIDE 30/500 ML
340 PLASTIC BAG, INJECTION (ML) INTRAVENOUS CONTINUOUS PRN
Status: DISCONTINUED | OUTPATIENT
Start: 2025-08-19 | End: 2025-08-19 | Stop reason: HOSPADM

## 2025-08-19 RX ORDER — METOCLOPRAMIDE HYDROCHLORIDE 5 MG/ML
10 INJECTION INTRAMUSCULAR; INTRAVENOUS EVERY 6 HOURS PRN
Status: DISCONTINUED | OUTPATIENT
Start: 2025-08-19 | End: 2025-08-19 | Stop reason: HOSPADM

## 2025-08-19 RX ORDER — NALBUPHINE HYDROCHLORIDE 10 MG/ML
2.5-5 INJECTION INTRAMUSCULAR; INTRAVENOUS; SUBCUTANEOUS EVERY 6 HOURS PRN
Status: DISCONTINUED | OUTPATIENT
Start: 2025-08-19 | End: 2025-08-21 | Stop reason: HOSPADM

## 2025-08-19 RX ORDER — IBUPROFEN 400 MG/1
800 TABLET, FILM COATED ORAL
Status: DISCONTINUED | OUTPATIENT
Start: 2025-08-19 | End: 2025-08-19

## 2025-08-19 RX ORDER — BISACODYL 10 MG
10 SUPPOSITORY, RECTAL RECTAL DAILY PRN
Status: DISCONTINUED | OUTPATIENT
Start: 2025-08-19 | End: 2025-08-21 | Stop reason: HOSPADM

## 2025-08-19 RX ORDER — LOPERAMIDE HYDROCHLORIDE 2 MG/1
2 CAPSULE ORAL
Status: DISCONTINUED | OUTPATIENT
Start: 2025-08-19 | End: 2025-08-19 | Stop reason: HOSPADM

## 2025-08-19 RX ORDER — TRANEXAMIC ACID 10 MG/ML
1 INJECTION, SOLUTION INTRAVENOUS EVERY 30 MIN PRN
Status: DISCONTINUED | OUTPATIENT
Start: 2025-08-19 | End: 2025-08-21 | Stop reason: HOSPADM

## 2025-08-19 RX ORDER — CARBOPROST TROMETHAMINE 250 UG/ML
250 INJECTION, SOLUTION INTRAMUSCULAR
Status: DISCONTINUED | OUTPATIENT
Start: 2025-08-19 | End: 2025-08-21 | Stop reason: HOSPADM

## 2025-08-19 RX ORDER — PROCHLORPERAZINE MALEATE 5 MG/1
10 TABLET ORAL EVERY 6 HOURS PRN
Status: DISCONTINUED | OUTPATIENT
Start: 2025-08-19 | End: 2025-08-19 | Stop reason: HOSPADM

## 2025-08-19 RX ORDER — METHYLERGONOVINE MALEATE 0.2 MG/ML
200 INJECTION INTRAVENOUS
Status: DISCONTINUED | OUTPATIENT
Start: 2025-08-19 | End: 2025-08-21 | Stop reason: HOSPADM

## 2025-08-19 RX ORDER — CITRIC ACID/SODIUM CITRATE 334-500MG
30 SOLUTION, ORAL ORAL
Status: DISCONTINUED | OUTPATIENT
Start: 2025-08-19 | End: 2025-08-19 | Stop reason: HOSPADM

## 2025-08-19 RX ORDER — ONDANSETRON 2 MG/ML
4 INJECTION INTRAMUSCULAR; INTRAVENOUS EVERY 6 HOURS PRN
Status: DISCONTINUED | OUTPATIENT
Start: 2025-08-19 | End: 2025-08-19 | Stop reason: HOSPADM

## 2025-08-19 RX ORDER — ACETAMINOPHEN 325 MG/1
650 TABLET ORAL EVERY 4 HOURS PRN
Status: DISCONTINUED | OUTPATIENT
Start: 2025-08-19 | End: 2025-08-21 | Stop reason: HOSPADM

## 2025-08-19 RX ORDER — MISOPROSTOL 200 UG/1
400 TABLET ORAL
Status: DISCONTINUED | OUTPATIENT
Start: 2025-08-19 | End: 2025-08-19 | Stop reason: HOSPADM

## 2025-08-19 RX ORDER — KETOROLAC TROMETHAMINE 15 MG/ML
15 INJECTION, SOLUTION INTRAMUSCULAR; INTRAVENOUS
Status: DISCONTINUED | OUTPATIENT
Start: 2025-08-19 | End: 2025-08-19

## 2025-08-19 RX ORDER — LOPERAMIDE HYDROCHLORIDE 2 MG/1
4 CAPSULE ORAL
Status: DISCONTINUED | OUTPATIENT
Start: 2025-08-19 | End: 2025-08-21 | Stop reason: HOSPADM

## 2025-08-19 RX ORDER — SODIUM CHLORIDE, SODIUM LACTATE, POTASSIUM CHLORIDE, CALCIUM CHLORIDE 600; 310; 30; 20 MG/100ML; MG/100ML; MG/100ML; MG/100ML
INJECTION, SOLUTION INTRAVENOUS CONTINUOUS PRN
Status: DISCONTINUED | OUTPATIENT
Start: 2025-08-19 | End: 2025-08-19 | Stop reason: HOSPADM

## 2025-08-19 RX ORDER — MISOPROSTOL 200 UG/1
800 TABLET ORAL
Status: DISCONTINUED | OUTPATIENT
Start: 2025-08-19 | End: 2025-08-21 | Stop reason: HOSPADM

## 2025-08-19 RX ORDER — METHYLERGONOVINE MALEATE 0.2 MG/ML
200 INJECTION INTRAVENOUS
Status: DISCONTINUED | OUTPATIENT
Start: 2025-08-19 | End: 2025-08-19 | Stop reason: HOSPADM

## 2025-08-19 RX ORDER — FENTANYL CITRATE-0.9 % NACL/PF 10 MCG/ML
100 PLASTIC BAG, INJECTION (ML) INTRAVENOUS EVERY 5 MIN PRN
Status: DISCONTINUED | OUTPATIENT
Start: 2025-08-19 | End: 2025-08-19 | Stop reason: HOSPADM

## 2025-08-19 RX ORDER — LIDOCAINE 40 MG/G
CREAM TOPICAL
Status: DISCONTINUED | OUTPATIENT
Start: 2025-08-19 | End: 2025-08-19 | Stop reason: HOSPADM

## 2025-08-19 RX ORDER — TRANEXAMIC ACID 10 MG/ML
1 INJECTION, SOLUTION INTRAVENOUS EVERY 30 MIN PRN
Status: DISCONTINUED | OUTPATIENT
Start: 2025-08-19 | End: 2025-08-19 | Stop reason: HOSPADM

## 2025-08-19 RX ORDER — ROPIVACAINE HYDROCHLORIDE 2 MG/ML
10 INJECTION, SOLUTION EPIDURAL; INFILTRATION; PERINEURAL ONCE
Status: DISCONTINUED | OUTPATIENT
Start: 2025-08-19 | End: 2025-08-19 | Stop reason: HOSPADM

## 2025-08-19 RX ORDER — LOPERAMIDE HYDROCHLORIDE 2 MG/1
4 CAPSULE ORAL
Status: DISCONTINUED | OUTPATIENT
Start: 2025-08-19 | End: 2025-08-19 | Stop reason: HOSPADM

## 2025-08-19 RX ORDER — SODIUM CHLORIDE, SODIUM LACTATE, POTASSIUM CHLORIDE, CALCIUM CHLORIDE 600; 310; 30; 20 MG/100ML; MG/100ML; MG/100ML; MG/100ML
INJECTION, SOLUTION INTRAVENOUS CONTINUOUS
Status: DISCONTINUED | OUTPATIENT
Start: 2025-08-19 | End: 2025-08-19 | Stop reason: HOSPADM

## 2025-08-19 RX ADMIN — IBUPROFEN 800 MG: 400 TABLET ORAL at 21:02

## 2025-08-19 RX ADMIN — ACETAMINOPHEN 650 MG: 325 TABLET ORAL at 21:02

## 2025-08-19 RX ADMIN — SODIUM CHLORIDE, SODIUM LACTATE, POTASSIUM CHLORIDE, AND CALCIUM CHLORIDE 500 ML: .6; .31; .03; .02 INJECTION, SOLUTION INTRAVENOUS at 17:05

## 2025-08-19 RX ADMIN — ROPIVACAINE HYDROCHLORIDE 10 ML: 2 INJECTION, SOLUTION EPIDURAL; INFILTRATION at 16:59

## 2025-08-19 RX ADMIN — Medication: at 17:15

## 2025-08-19 RX ADMIN — SODIUM CHLORIDE, SODIUM LACTATE, POTASSIUM CHLORIDE, AND CALCIUM CHLORIDE: .6; .31; .03; .02 INJECTION, SOLUTION INTRAVENOUS at 13:28

## 2025-08-19 RX ADMIN — Medication 2 MILLI-UNITS/MIN: at 13:34

## 2025-08-19 ASSESSMENT — ACTIVITIES OF DAILY LIVING (ADL)
ADLS_ACUITY_SCORE: 20
ADLS_ACUITY_SCORE: 46
ADLS_ACUITY_SCORE: 22
ADLS_ACUITY_SCORE: 20

## 2025-08-20 LAB
APTT PPP: 25 SECONDS (ref 22–38)
BASOPHILS # BLD AUTO: 0.03 10E3/UL (ref 0–0.2)
BASOPHILS NFR BLD AUTO: 0.2 %
D DIMER PPP FEU-MCNC: 2.27 UG/ML FEU (ref 0–0.5)
EOSINOPHIL # BLD AUTO: 0.17 10E3/UL (ref 0–0.7)
EOSINOPHIL NFR BLD AUTO: 1.4 %
ERYTHROCYTE [DISTWIDTH] IN BLOOD BY AUTOMATED COUNT: 12.4 % (ref 10–15)
FIBRINOGEN PPP-MCNC: 346 MG/DL (ref 170–510)
HCT VFR BLD AUTO: 30.2 % (ref 35–47)
HGB BLD-MCNC: 10.4 G/DL (ref 11.7–15.7)
IMM GRANULOCYTES # BLD: 0.07 10E3/UL
IMM GRANULOCYTES NFR BLD: 0.6 %
INR PPP: 0.93 (ref 0.85–1.15)
LYMPHOCYTES # BLD AUTO: 1.27 10E3/UL (ref 0.8–5.3)
LYMPHOCYTES NFR BLD AUTO: 10.5 %
MCH RBC QN AUTO: 31.2 PG (ref 26.5–33)
MCHC RBC AUTO-ENTMCNC: 34.4 G/DL (ref 31.5–36.5)
MCV RBC AUTO: 90.7 FL (ref 78–100)
MONOCYTES # BLD AUTO: 1.03 10E3/UL (ref 0–1.3)
MONOCYTES NFR BLD AUTO: 8.5 %
NEUTROPHILS # BLD AUTO: 9.58 10E3/UL (ref 1.6–8.3)
NEUTROPHILS NFR BLD AUTO: 78.8 %
NRBC # BLD AUTO: <0.03 10E3/UL
NRBC BLD AUTO-RTO: 0 /100
PLATELET # BLD AUTO: 136 10E3/UL (ref 150–450)
PROTHROMBIN TIME: 12.3 SECONDS (ref 11.8–14.8)
RBC # BLD AUTO: 3.33 10E6/UL (ref 3.8–5.2)
WBC # BLD AUTO: 12.15 10E3/UL (ref 4–11)

## 2025-08-20 PROCEDURE — 250N000009 HC RX 250: Performed by: OBSTETRICS & GYNECOLOGY

## 2025-08-20 PROCEDURE — 250N000013 HC RX MED GY IP 250 OP 250 PS 637: Performed by: OBSTETRICS & GYNECOLOGY

## 2025-08-20 PROCEDURE — 85384 FIBRINOGEN ACTIVITY: CPT | Performed by: OBSTETRICS & GYNECOLOGY

## 2025-08-20 PROCEDURE — 120N000012 HC R&B POSTPARTUM

## 2025-08-20 PROCEDURE — 85610 PROTHROMBIN TIME: CPT | Performed by: OBSTETRICS & GYNECOLOGY

## 2025-08-20 PROCEDURE — 36415 COLL VENOUS BLD VENIPUNCTURE: CPT | Performed by: OBSTETRICS & GYNECOLOGY

## 2025-08-20 PROCEDURE — 250N000011 HC RX IP 250 OP 636: Performed by: OBSTETRICS & GYNECOLOGY

## 2025-08-20 PROCEDURE — 85730 THROMBOPLASTIN TIME PARTIAL: CPT | Performed by: OBSTETRICS & GYNECOLOGY

## 2025-08-20 PROCEDURE — 85025 COMPLETE CBC W/AUTO DIFF WBC: CPT | Performed by: OBSTETRICS & GYNECOLOGY

## 2025-08-20 PROCEDURE — 85379 FIBRIN DEGRADATION QUANT: CPT | Performed by: OBSTETRICS & GYNECOLOGY

## 2025-08-20 RX ORDER — FERROUS SULFATE 325(65) MG
325 TABLET ORAL DAILY
Status: DISCONTINUED | OUTPATIENT
Start: 2025-08-20 | End: 2025-08-21 | Stop reason: HOSPADM

## 2025-08-20 RX ADMIN — TRANEXAMIC ACID 1 G: 10 INJECTION, SOLUTION INTRAVENOUS at 07:58

## 2025-08-20 RX ADMIN — METHYLCELLULOSE 1000 MG: 500 TABLET ORAL at 08:27

## 2025-08-20 RX ADMIN — ACETAMINOPHEN 650 MG: 325 TABLET ORAL at 16:43

## 2025-08-20 RX ADMIN — ACETAMINOPHEN 650 MG: 325 TABLET ORAL at 04:14

## 2025-08-20 RX ADMIN — FERROUS SULFATE TAB 325 MG (65 MG ELEMENTAL FE) 325 MG: 325 (65 FE) TAB at 10:17

## 2025-08-20 RX ADMIN — IBUPROFEN 800 MG: 400 TABLET ORAL at 10:17

## 2025-08-20 RX ADMIN — ACETAMINOPHEN 650 MG: 325 TABLET ORAL at 10:17

## 2025-08-20 RX ADMIN — IBUPROFEN 800 MG: 400 TABLET ORAL at 16:43

## 2025-08-20 RX ADMIN — METHYLERGONOVINE MALEATE 200 MCG: 0.2 INJECTION INTRAVENOUS at 02:30

## 2025-08-20 RX ADMIN — IBUPROFEN 800 MG: 400 TABLET ORAL at 04:14

## 2025-08-20 ASSESSMENT — ACTIVITIES OF DAILY LIVING (ADL)
ADLS_ACUITY_SCORE: 22

## 2025-08-21 VITALS
BODY MASS INDEX: 28.52 KG/M2 | SYSTOLIC BLOOD PRESSURE: 113 MMHG | OXYGEN SATURATION: 96 % | RESPIRATION RATE: 16 BRPM | HEIGHT: 63 IN | WEIGHT: 160.94 LBS | TEMPERATURE: 98.2 F | HEART RATE: 88 BPM | DIASTOLIC BLOOD PRESSURE: 77 MMHG

## 2025-08-21 PROCEDURE — 250N000013 HC RX MED GY IP 250 OP 250 PS 637: Performed by: OBSTETRICS & GYNECOLOGY

## 2025-08-21 RX ORDER — IBUPROFEN 600 MG/1
600 TABLET, FILM COATED ORAL EVERY 6 HOURS PRN
Qty: 60 TABLET | Refills: 0 | Status: SHIPPED | OUTPATIENT
Start: 2025-08-21

## 2025-08-21 RX ADMIN — FERROUS SULFATE TAB 325 MG (65 MG ELEMENTAL FE) 325 MG: 325 (65 FE) TAB at 08:41

## 2025-08-21 RX ADMIN — METHYLCELLULOSE 1000 MG: 500 TABLET ORAL at 08:41

## 2025-08-21 RX ADMIN — IBUPROFEN 800 MG: 400 TABLET ORAL at 00:30

## 2025-08-21 RX ADMIN — ACETAMINOPHEN 650 MG: 325 TABLET ORAL at 00:30

## 2025-08-21 RX ADMIN — ACETAMINOPHEN 650 MG: 325 TABLET ORAL at 08:41

## 2025-08-21 RX ADMIN — IBUPROFEN 800 MG: 400 TABLET ORAL at 08:41

## 2025-08-21 ASSESSMENT — ACTIVITIES OF DAILY LIVING (ADL)
ADLS_ACUITY_SCORE: 22
ADLS_ACUITY_SCORE: 26
ADLS_ACUITY_SCORE: 22
ADLS_ACUITY_SCORE: 26
ADLS_ACUITY_SCORE: 22
ADLS_ACUITY_SCORE: 22